# Patient Record
Sex: FEMALE | Race: WHITE | Employment: FULL TIME | URBAN - METROPOLITAN AREA
[De-identification: names, ages, dates, MRNs, and addresses within clinical notes are randomized per-mention and may not be internally consistent; named-entity substitution may affect disease eponyms.]

---

## 2017-01-26 ENCOUNTER — TELEPHONE (OUTPATIENT)
Dept: CARDIOLOGY CLINIC | Age: 32
End: 2017-01-26

## 2017-01-26 NOTE — TELEPHONE ENCOUNTER
Pt called to schedule the tilt table test and would like to speak with you. I tentatively scheduled her for 2/8 with Dr. Brittany aHrris but she wanted to get the test done asap. Please call [phone]. Thanks!

## 2017-02-08 ENCOUNTER — OFFICE VISIT (OUTPATIENT)
Dept: CARDIOLOGY CLINIC | Age: 32
End: 2017-02-08

## 2017-02-08 VITALS
RESPIRATION RATE: 16 BRPM | SYSTOLIC BLOOD PRESSURE: 118 MMHG | BODY MASS INDEX: 23.23 KG/M2 | HEART RATE: 74 BPM | WEIGHT: 148 LBS | HEIGHT: 67 IN | OXYGEN SATURATION: 100 % | DIASTOLIC BLOOD PRESSURE: 88 MMHG

## 2017-02-08 DIAGNOSIS — R42 DIZZINESS: Primary | ICD-10-CM

## 2017-02-08 RX ORDER — ONDANSETRON HYDROCHLORIDE 8 MG/1
16 TABLET, FILM COATED ORAL 2 TIMES DAILY
COMMUNITY
End: 2017-03-31

## 2017-02-08 RX ORDER — LEVOTHYROXINE SODIUM 125 UG/1
TABLET ORAL
Refills: 6 | COMMUNITY
Start: 2017-01-09

## 2017-02-08 RX ORDER — NORTRIPTYLINE HYDROCHLORIDE 25 MG/1
CAPSULE ORAL
COMMUNITY
End: 2017-03-31

## 2017-02-08 NOTE — PROGRESS NOTES
Extended / Orthostatic Vitals:  Patient Position 2: Supine  BP 2: 118/88  Pulse 2: 74  Patient Position 3: Standing  BP 3: 92/60  Pulse 3: 92

## 2017-02-08 NOTE — PATIENT INSTRUCTIONS
Treatment Plan:  Tilt Table Study    You are scheduled for a Tilt Table Study at [facility] on Tuesday, March 7th. Please arrive at the patient registration desk located on the 2nd floor of the main building at 10:30am.     Do not eat or drink anything after midnight the night before your procedure. You will need a . You may take your normal medications with a sip of water the morning of your procedure. Please call Irvin Son or Esmer Art if you have any questions at [phone]. Please call the office if you develop any type of illness prior to your procedure. Please contact our business office at [phone] with any financial concerns. Tilt Table Test: About This Test  What is it? A tilt table test is used to check people who have fainted or who often feel lightheaded. The results help your doctor know the cause of your fainting or feeling lightheaded. The test uses a special table that slowly tilts you to an upright position. It checks how your body responds when you change positions. Why is this test done? This test checks what causes your symptoms by monitoring them while changing your position. Your doctor can see if you faint or feel lightheaded because of problems with your heart rate or blood pressure. When people move from a lying position to an upright one, their blood pressure normally drops. But the body adjusts to this. Your nervous system senses changes in body position and controls your heart rate and blood pressure. If the nervous system doesn't work properly, you might feel lightheaded or faint. This can happen if your blood pressure stays too low. Your heart rate also may slow down or speed up. You feel lightheaded because your brain is not getting a normal amount of blood for a short time. This problem is called syncope (say \"RJEP-hhx-mmm\"). Syncope might happen during the test when you change to an upright position.   How can you prepare for the test?  · Tell your doctor about any medicines you take. Ask your doctor if you need to stop taking any medicines before the test.  · You may be asked to not eat or drink for a few hours before the test.  What happens during the test?  · The test is usually done in a hospital or a cardiologist's office. · You will have small patches or pads attached to your skin. These are sensors that monitor your heart. You will also have a blood pressure cuff on your arm. And you may have an IV. · During the test, you will lie flat on a table that can tilt you up to almost a standing position. You will be strapped securely to the table. · Your heart rate and blood pressure are checked regularly as the table is tilted up. · You will be asked if you feel any symptoms like nausea, sweating, dizziness, or an abnormal heartbeat. If you don't have any symptoms, you may be given medicine to speed up your heart rate. Then you will be checked for symptoms again. · If you faint during the test, the table will be returned to a flat position. You will be checked closely and taken care of right away by your medical team. Most people wake up right away. What else should you know about the test?  · The test result is normal if your blood pressure stays stable during the test and you do not feel lightheaded or faint. The test result is not normal if your blood pressure drops and you feel lightheaded or faint. These symptoms might happen because of a slow heart rate. How long does the test take? · The test will take about an hour. It may take longer if you get medicine to speed up your heart during the test.  What happens after the test?  · Your heart rate and blood pressure will be checked before you go home. · You may need to have someone drive you home after the test.  · You can probably go back to your usual activities right away.  But some people feel a little tired or nauseated  Follow-up care is a key part of your treatment and safety. Be sure to make and go to all appointments, and call your doctor if you are having problems. It's also a good idea to keep a list of the medicines you take. Ask your doctor when you can expect to have your test results. Where can you learn more? Go to http://diamond-daniel.info/. Enter Y984 in the search box to learn more about \"Tilt Table Test: About This Test.\"  Current as of: January 27, 2016  Content Version: 11.1  © 4515-7883 [employer]. Care instructions adapted under license by [employer] (which disclaims liability or warranty for this information). If you have questions about a medical condition or this instruction, always ask your healthcare professional. [address] any warranty or liability for your use of this information.

## 2017-03-06 RX ORDER — SODIUM CHLORIDE 0.9 % (FLUSH) 0.9 %
5-10 SYRINGE (ML) INJECTION EVERY 8 HOURS
Status: CANCELLED | OUTPATIENT
Start: 2017-03-07

## 2017-03-06 RX ORDER — SODIUM CHLORIDE 0.9 % (FLUSH) 0.9 %
5-10 SYRINGE (ML) INJECTION AS NEEDED
Status: CANCELLED | OUTPATIENT
Start: 2017-03-07

## 2017-03-06 NOTE — ROUTINE PROCESS
3:27 PM Pt's. Chart reviewed possibly including viewing of labs, test results, imaging results and history and physical for possible cath/angio/EP procedure.

## 2017-03-07 ENCOUNTER — HOSPITAL ENCOUNTER (OUTPATIENT)
Dept: NON INVASIVE DIAGNOSTICS | Age: 32
Discharge: HOME | End: 2017-03-07
Attending: INTERNAL MEDICINE | Admitting: INTERNAL MEDICINE
Payer: COMMERCIAL

## 2017-03-07 VITALS
WEIGHT: 147.27 LBS | BODY MASS INDEX: 23.11 KG/M2 | OXYGEN SATURATION: 98 % | DIASTOLIC BLOOD PRESSURE: 66 MMHG | HEIGHT: 67 IN | HEART RATE: 64 BPM | SYSTOLIC BLOOD PRESSURE: 113 MMHG | RESPIRATION RATE: 17 BRPM | TEMPERATURE: 97.8 F

## 2017-03-07 PROCEDURE — 74011250636 HC RX REV CODE- 250/636: Performed by: INTERNAL MEDICINE

## 2017-03-07 PROCEDURE — 77030018729 HC ELECTRD DEFIB PAD CARD -B

## 2017-03-07 PROCEDURE — 93660 TILT TABLE EVALUATION: CPT

## 2017-03-07 PROCEDURE — 74011000250 HC RX REV CODE- 250: Performed by: INTERNAL MEDICINE

## 2017-03-07 RX ORDER — SODIUM CHLORIDE 0.9 % (FLUSH) 0.9 %
5-10 SYRINGE (ML) INJECTION AS NEEDED
Status: DISCONTINUED | OUTPATIENT
Start: 2017-03-07 | End: 2017-03-07 | Stop reason: HOSPADM

## 2017-03-07 RX ORDER — SODIUM CHLORIDE 0.9 % (FLUSH) 0.9 %
5-10 SYRINGE (ML) INJECTION EVERY 8 HOURS
Status: DISCONTINUED | OUTPATIENT
Start: 2017-03-07 | End: 2017-03-07 | Stop reason: HOSPADM

## 2017-03-07 RX ORDER — ATROPINE SULFATE 0.1 MG/ML
0.5 INJECTION INTRAVENOUS
Status: DISCONTINUED | OUTPATIENT
Start: 2017-03-07 | End: 2017-03-07 | Stop reason: HOSPADM

## 2017-03-07 RX ORDER — FLUDROCORTISONE ACETATE 0.1 MG/1
0.1 TABLET ORAL DAILY
Qty: 30 TAB | Refills: 3 | Status: SHIPPED | OUTPATIENT
Start: 2017-03-07 | End: 2017-03-16 | Stop reason: SDUPTHER

## 2017-03-07 RX ORDER — SODIUM CHLORIDE 9 MG/ML
1000 INJECTION, SOLUTION INTRAVENOUS ONCE
Status: COMPLETED | OUTPATIENT
Start: 2017-03-07 | End: 2017-03-07

## 2017-03-07 RX ADMIN — SODIUM CHLORIDE 1000 ML: 900 INJECTION, SOLUTION INTRAVENOUS at 11:10

## 2017-03-07 RX ADMIN — SODIUM CHLORIDE 2 MCG/MIN: 900 INJECTION, SOLUTION INTRAVENOUS at 12:54

## 2017-03-07 RX ADMIN — Medication 10 ML: at 11:05

## 2017-03-07 NOTE — PROCEDURES
Cardiac Electrophysiology Report        PATIENT INFORMATION     Patient Name: Telma Rudolph MRN: [id number]      Study Date: 3/7/2017    YOB: 1985   Age: 33 y.o. Gender: female      Referring Physician:  Carlos Rueda DO                  Procedure:  Tilt table test      STAFF     Duty Name   Electrophysiologist Caleen Meigs, MD   Monitor Joy Nance RN; Livia Butts RN   Circulator Екатерина Morris RN       PATIENT HISTORY     Telma Rudolph is a 33 y.o. female new patient referred for possible tilt table testing. She reports intermittent episodes of shortness of breath, fatigue, ataxia and presyncope that occur while walking. She has only experienced one episode of syncope approximately 10 years ago that was unrelated to her current symptoms. Her urine is concentrated. Above symptoms started after fall which caused head trauma in 2015 for which she presented to ER. She began experiencing symptoms of vertigo which then progressed to ataxia. Testing at that time was unremarkable. She also reports history of orthostatic hypotension. She presents for a tilt table test now. PROCEDURE     The patient was brought to the Cardiac Electrophysiology laboratory in a post-absorptive, fasting state. Informed consent was obtained. A peripheral IV was in place. One liter of normal saline was infused prior to the procedure. Continuous electrocardiographic, blood pressure, O2 saturation and  CO2 monitoring was initiated. The patient was secured onto the tilt table test. Regular measurements for blood pressure, heart rate and rhythm were performed at 3 minute intervals. The patient was tilted to 80 degrees and BP/HR/symptoms were recorded at 3 minute intervals. Isoproteronol infusion was then initiated and the protocol was repeated. Carotid bruits were not audible prior to the exam. Carotid massage was performed bilaterally.  The patient remained hemodynamically stable, tolerated the procedure well and was transferred in stable condition. There were no immediate complications/blood loss encountered during the procedure. No specimens were removed. TILT TABLE TEST DATA     Time Blood Pressure Heart Rate Symptoms   Baseline 106/50 59    3 minutes 101/59 66 Dizziness   6 minutes 86/52 81 IV fluids started   9 minutes 86/87 85    12 minutes 92/85 85          (Isoproteronol infusion 2 mcg/min)      Baseline 92/85 85    3 minutes 67/40 134     6 minutes 86/44 139 Isuprel discontinued   9 minutes 132/72 69 Dizziness resolved   (Isoproteronol off)        Carotid massage: Negative bilaterally      MEDICATION SUMMARY     Medication Route Unit Total   Isoproteronol IV micrograms/min 2   Normal saline IV liters 1       CONCLUSIONS     1. Orthostatic hypotension  2. Trial of thigh-high compression socks  3. Trial of florinef. 4. Follow up in 1 month. Thank you, Carie Mcarthur DO for involving me in the care of this extraordinarily pleasant female.        Catherine Trimble MD  Cardiac Electrophysiology / Cardiology    [address], [address] [facility], [address]  Alphonse English [address] [place], [place] [address]  [phone] / [phone] Fax [phone] / [phone] Fax

## 2017-03-07 NOTE — H&P
HISTORY OF PRESENTING ILLNESS       Jillian Rouse is a 33 y.o. female new patient referred for possible tilt table testing. She reports intermittent episodes of shortness of breath, fatigue, ataxia and presyncope that occur when she is walking. She has only experienced one episode of syncope approximately 10 years ago that was unrelated to her current symptoms. Her urine is concentrated. Above symptoms started after fall which caused head trauma in January 2015 for which she presented to ER. She began experiencing symptoms of vertigo which then progressed to ataxia. All testing at the hospital at that time was normal. She also reports history of orthostatic hypotension.          ACTIVE PROBLEM LIST           Patient Active Problem List     Diagnosis Date Noted    Shuffling gait 10/18/2016    Migraine with aura, without mention of intractable migraine without mention of status migrainosus 03/31/2015    Memory loss 03/31/2015    Ataxia 03/31/2015    Sleep apnea 03/31/2015    ADHD (attention deficit hyperactivity disorder) 03/31/2015    B12 deficiency 03/31/2015    Osteoporosis 03/31/2015    Dizziness 01/23/2015            PAST MEDICAL HISTORY            Past Medical History   Diagnosis Date    Depression      Hypothyroid      Hypothyroid      Insomnia      Migraine      Shuffling gait 10/18/2016       --intermittent when head was shaking/bobbing     Vertigo              PAST SURGICAL HISTORY      History reviewed.  No pertinent past surgical history.        ALLERGIES      No Known Allergies         FAMILY HISTORY            Family History   Problem Relation Age of Onset    Heart Disease Maternal Grandfather      Cancer Paternal Grandmother      Heart Disease Paternal Grandmother      negative for cardiac disease        SOCIAL HISTORY       Social History                Social History    Marital status: SINGLE       Spouse name: N/A    Number of children: N/A    Years of education: N/A              Social History Main Topics     Smoking status: Never Smoker     Smokeless tobacco: None     Alcohol use Yes         Comment: rarely     Drug use: No     Sexual activity: Not Asked            Other Topics Concern    None      Social History Narrative               MEDICATIONS           Current Outpatient Prescriptions   Medication Sig    SYNTHROID 125 mcg tablet TAKE 1 TABLET BY MOUTH DAILY    ondansetron hcl (ZOFRAN, AS HYDROCHLORIDE,) 8 mg tablet Take 16 mg by mouth two (2) times a day.  nortriptyline (PAMELOR) 25 mg capsule Take by mouth nightly.  Armodafinil (NUVIGIL) 250 mg tab tablet TAKE 1 TABLET BY MOUTH DAILY    ADDERALL XR 15 mg XR capsule Take 20 mg by mouth as needed.  CRYSELLE, 28, 0.3-30 mg-mcg per tablet      sertraline (ZOLOFT) 100 mg tablet Take 100 mg by mouth daily.      No current facility-administered medications for this visit.          I have reviewed the nurses notes, vitals, problem list, allergy list, medical history, family, social history and medications.        REVIEW OF SYMPTOMS       General: Pt denies excessive weight gain or loss. Pt is able to conduct ADL's  HEENT: Denies blurred vision, headaches, hearing loss, epistaxis and difficulty swallowing. Respiratory: Denies cough, congestion, shortness of breath, WOODS, wheezing or stridor. Cardiovascular: Denies precordial pain, palpitations, edema or PND  Gastrointestinal: Denies poor appetite, indigestion, abdominal pain or blood in stool  Genitourinary: Denies hematuria, dysuria, increased urinary frequency  Musculoskeletal: Denies joint pain or swelling from muscles or joints  Neurologic: Denies tremor, paresthesias, headache, or sensory motor disturbance  Psychiatric: Denies confusion, insomnia, depression  Integumentray: Denies rash, itching or ulcers.   Hematologic: Denies easy bruising, bleeding     PHYSICAL EXAMINATION           Vitals:     02/08/17 1538   BP: 118/88   Pulse: 74 Resp: 16   SpO2: 100%   Weight: 148 lb (67.1 kg)   Height: 5' 7\" (1.702 m)      General: Well developed, in no acute distress. HEENT: No jaundice, oral mucosa moist, no oral ulcers  Neck: Supple, no stiffness, no lymphadenopathy, supple  Heart:  Normal S1/S2 negative S3 or S4. Regular, no murmur, gallop or rub, no jugular venous distention  Respiratory: Clear bilaterally x 4, no wheezing or rales  Abdomen:   Soft, non-tender, bowel sounds are active.   Extremities: No edema, normal cap refill, no cyanosis. Musculoskeletal: No clubbing, no deformities  Neuro: A&Ox3, speech clear, gait stable, cooperative, no focal neurologic deficits  Skin: Skin color is normal. No rashes or lesions. Non diaphoretic, moist.  Vascular: 2+ pulses symmetric in all extremities        DIAGNOSTIC DATA       EKG:         LABORATORY DATA             Lab Results   Component Value Date/Time     WBC 4.9 10/18/2016 06:26 AM     HGB 11.9 10/18/2016 06:26 AM     HCT 36.6 10/18/2016 06:26 AM     PLATELET 984 21/90/6169 06:26 AM     MCV 88.4 10/18/2016 06:26 AM            Lab Results   Component Value Date/Time     Sodium 142 10/18/2016 06:26 AM     Potassium 3.7 10/18/2016 06:26 AM     Chloride 108 10/18/2016 06:26 AM     CO2 25 10/18/2016 06:26 AM     Anion gap 9 10/18/2016 06:26 AM     Glucose 87 10/18/2016 06:26 AM     BUN 9 10/18/2016 06:26 AM     Creatinine 0.68 10/18/2016 06:26 AM     BUN/Creatinine ratio 13 10/18/2016 06:26 AM     GFR est AA >60 10/18/2016 06:26 AM     GFR est non-AA >60 10/18/2016 06:26 AM     Calcium 7.8 10/18/2016 06:26 AM     Bilirubin, total 0.4 10/17/2016 04:49 PM     AST (SGOT) 18 10/17/2016 04:49 PM     Alk. phosphatase 34 10/17/2016 04:49 PM     Protein, total 8.1 10/17/2016 04:49 PM     Albumin 4.0 10/17/2016 04:49 PM     Globulin 4.1 10/17/2016 04:49 PM     A-G Ratio 1.0 10/17/2016 04:49 PM     ALT (SGPT) 22 10/17/2016 04:49 PM            ASSESSMENT       1. Dizziness  2. Presyncope  3.  Ataxia         PLAN    Will arrange for tilt table test.      FOLLOW-UP      Follow up after testing completed.      Thank you, Kerri Marie DO and Dr Ginna Ojeda for involving me in the care of this extraordinarily pleasant female. Please do not hesitate to contact me for further questions/concerns.      Written by Shilpa Fulton, as dictated by Per Lucero MD.      Per Lucero MD  Cardiac Electrophysiology / Cardiology     [address]  [phone] / [phone] Fax   [phone] / [phone] Fax           No changes in H/P since clinic visit.  Proceed with tilt table test.     Per Lucero MD

## 2017-03-07 NOTE — PROGRESS NOTES
Patient received via personal wheel chair from waiting area, verbalizes understanding for scheduled procedure, questions answered, and consent signed. Pt verbalizes that Dr. Guerline Ricks informed her that she needed a ride home as she might not be safe to drive herself. Pt states that she was not able to get her friend to drive her here and that she drove herself and plans to drive herself home. Dr. Guerline Ricks informed that pt arrived via wheel chair and without a . He stated that we would proceed with the Tilt Table Study. 200 Pt is prepped and ready for her procedure. 1210 Pt lying PL, both eyes closed, and appears to be asleep. 1235 TRANSFER - OUT REPORT:    Verbal report given to Ruthie Dong (name) on [facility]  being transferred to EP Lab (unit) for ordered procedure       Report consisted of patients Situation, Background, Assessment and   Recommendations(SBAR). Information from the following report(s) SBAR was reviewed with the receiving nurse. Lines:   Peripheral IV 03/07/17 Right Antecubital (Active)   Site Assessment Clean, dry, & intact 3/7/2017 11:15 AM   Phlebitis Assessment 0 3/7/2017 11:15 AM   Infiltration Assessment 0 3/7/2017 11:15 AM   Dressing Status New 3/7/2017 11:15 AM   Dressing Type Tape;Transparent 3/7/2017 11:15 AM   Hub Color/Line Status Pink 3/7/2017 11:15 AM   Action Taken Open ports on tubing capped 3/7/2017 11:15 AM   Alcohol Cap Used Yes 3/7/2017 11:15 AM        Opportunity for questions and clarification was provided. Patient transported with:   Registered Nurse X 2.

## 2017-03-07 NOTE — DISCHARGE INSTRUCTIONS
Tilt Table Test: About This Test  What is it? A tilt table test is used to check people who have fainted or who often feel lightheaded. The results help your doctor know the cause of your fainting or feeling lightheaded. The test uses a special table that slowly tilts you to an upright position. It checks how your body responds when you change positions. Why is this test done? This test checks what causes your symptoms by monitoring them while changing your position. Your doctor can see if you faint or feel lightheaded because of problems with your heart rate or blood pressure. When people move from a lying position to an upright one, their blood pressure normally drops. But the body adjusts to this. Your nervous system senses changes in body position and controls your heart rate and blood pressure. If the nervous system doesn't work properly, you might feel lightheaded or faint. This can happen if your blood pressure stays too low. Your heart rate also may slow down or speed up. You feel lightheaded because your brain is not getting a normal amount of blood for a short time. This problem is called syncope (say \"YSIX-frv-grc\"). Syncope might happen during the test when you change to an upright position. How can you prepare for the test?  · Tell your doctor about any medicines you take. Ask your doctor if you need to stop taking any medicines before the test.  · You may be asked to not eat or drink for a few hours before the test.  What happens during the test?  · The test is usually done in a hospital or a cardiologist's office. · You will have small patches or pads attached to your skin. These are sensors that monitor your heart. You will also have a blood pressure cuff on your arm. And you may have an IV. · During the test, you will lie flat on a table that can tilt you up to almost a standing position. You will be strapped securely to the table.   · Your heart rate and blood pressure are checked regularly as the table is tilted up. · You will be asked if you feel any symptoms like nausea, sweating, dizziness, or an abnormal heartbeat. If you don't have any symptoms, you may be given medicine to speed up your heart rate. Then you will be checked for symptoms again. · If you faint during the test, the table will be returned to a flat position. You will be checked closely and taken care of right away by your medical team. Most people wake up right away. What else should you know about the test?  · The test result is normal if your blood pressure stays stable during the test and you do not feel lightheaded or faint. The test result is not normal if your blood pressure drops and you feel lightheaded or faint. These symptoms might happen because of a slow heart rate. How long does the test take? · The test will take about an hour. It may take longer if you get medicine to speed up your heart during the test.  What happens after the test?  · Your heart rate and blood pressure will be checked before you go home. · You may need to have someone drive you home after the test.  · You can probably go back to your usual activities right away. But some people feel a little tired or nauseated  Follow-up care is a key part of your treatment and safety. Be sure to make and go to all appointments, and call your doctor if you are having problems. It's also a good idea to keep a list of the medicines you take. Ask your doctor when you can expect to have your test results. Dr. Yamilka Oh would like to see you in the office in one month. Where can you learn more? Go to http://diamond-daniel.info/. Enter D845 in the search box to learn more about \"Tilt Table Test: About This Test.\"  Current as of: January 27, 2016  Content Version: 11.1  © 3119-2834 [employer]. Care instructions adapted under license by [employer] (which disclaims liability or warranty for this information).  If you have questions about a medical condition or this instruction, always ask your healthcare professional. [place] [address] any warranty or liability for your use of this information.

## 2017-03-07 NOTE — PROGRESS NOTES
TRANSFER - OUT REPORT:    Verbal report given to Wilson Gruber RN on Janes  being transferred to [facility] for routine progression of care       Report consisted of patients Situation, Background, Assessment and   Recommendations(SBAR). Information from the following report(s) SBAR was reviewed with the receiving nurse. Lines:   Peripheral IV 03/07/17 Right Antecubital (Active)   Site Assessment Clean, dry, & intact 3/7/2017 11:15 AM   Phlebitis Assessment 0 3/7/2017 11:15 AM   Infiltration Assessment 0 3/7/2017 11:15 AM   Dressing Status New 3/7/2017 11:15 AM   Dressing Type Tape;Transparent 3/7/2017 11:15 AM   Hub Color/Line Status Pink 3/7/2017 11:15 AM   Action Taken Open ports on tubing capped 3/7/2017 11:15 AM   Alcohol Cap Used Yes 3/7/2017 11:15 AM        Opportunity for questions and clarification was provided.       Patient transported with:   Registered Nurse

## 2017-03-16 DIAGNOSIS — R42 DIZZINESS: Primary | ICD-10-CM

## 2017-03-16 RX ORDER — FLUDROCORTISONE ACETATE 0.1 MG/1
0.1 TABLET ORAL DAILY
Qty: 60 TAB | Refills: 6 | Status: SHIPPED | OUTPATIENT
Start: 2017-03-16 | End: 2017-03-29 | Stop reason: SDUPTHER

## 2017-03-16 NOTE — TELEPHONE ENCOUNTER
Requested Prescriptions     Signed Prescriptions Disp Refills    fludrocortisone (FLORINEF) 0.1 mg tablet 60 Tab 6     Sig: Take 1 Tab by mouth daily.      Authorizing Provider: Nancy Storm     Ordering User: Edgardo Wiley     Verbal order for refill per Dr. Toney Mcgrath

## 2017-03-29 DIAGNOSIS — R42 DIZZINESS: ICD-10-CM

## 2017-03-29 RX ORDER — FLUDROCORTISONE ACETATE 0.1 MG/1
0.2 TABLET ORAL DAILY
Qty: 60 TAB | Refills: 6 | Status: SHIPPED | OUTPATIENT
Start: 2017-03-29 | End: 2017-10-18 | Stop reason: SDUPTHER

## 2017-03-29 NOTE — TELEPHONE ENCOUNTER
Verbal order for refill per Dr. Brittany Harris  Requested Prescriptions     Signed Prescriptions Disp Refills    fludrocortisone (FLORINEF) 0.1 mg tablet 60 Tab 6     Sig: Take 2 Tabs by mouth daily.      Authorizing Provider: Fito Lozada     Ordering User: Capo Garnica

## 2017-03-31 ENCOUNTER — OFFICE VISIT (OUTPATIENT)
Dept: CARDIOLOGY CLINIC | Age: 32
End: 2017-03-31

## 2017-03-31 VITALS — BODY MASS INDEX: 23.18 KG/M2 | WEIGHT: 148 LBS

## 2017-03-31 DIAGNOSIS — I95.1 ORTHOSTASIS: Primary | ICD-10-CM

## 2017-03-31 RX ORDER — MIDODRINE HYDROCHLORIDE 5 MG/1
5 TABLET ORAL
Qty: 90 TAB | Refills: 0 | Status: SHIPPED | OUTPATIENT
Start: 2017-03-31 | End: 2017-04-03 | Stop reason: SDUPTHER

## 2017-03-31 NOTE — MR AVS SNAPSHOT
Visit Information Date & Time Provider Department Dept. Phone Encounter #  
 3/31/2017 10:40 AM Zulay Munoz MD [facility] [phone] [id number] Your Appointments 5/8/2017  9:40 AM  
ESTABLISHED PATIENT with Zulay Munoz MD  
[facility] ([facility]) Appt Note: 1 mo fu  
 [address] [facility] [address] [facility] 788  
 [facility] [address] [address] Upcoming Health Maintenance Date Due DTaP/Tdap/Td series (1 - Tdap) 1/24/2007 PAP AKA CERVICAL CYTOLOGY 1/24/2007 INFLUENZA AGE 9 TO ADULT 8/1/2016 Allergies as of 3/31/2017  Review Complete On: 3/31/2017 By: Zulay Munoz MD  
 No Known Allergies Current Immunizations  Never Reviewed No immunizations on file. Not reviewed this visit You Were Diagnosed With   
  
 Codes Comments Orthostasis    -  Primary ICD-10-CM: I95.1 ICD-9-CM: 458.0 Vitals Weight(growth percentile) BMI OB Status Smoking Status 148 lb (67.1 kg) 23.18 kg/m2 Having regular periods Never Smoker BMI and BSA Data Body Mass Index Body Surface Area  
 23.18 kg/m 2 1.78 m 2 Preferred Pharmacy Pharmacy Name Phone [id number], [address] Jenney Holstein [phone] Your Updated Medication List  
  
   
This list is accurate as of: 3/31/17 12:13 PM.  Always use your most recent med list.  
  
  
  
  
 ADDERALL XR 15 mg XR capsule Generic drug:  amphetamine-dextroamphetamine XR Take 20 mg by mouth as needed. fludrocortisone 0.1 mg tablet Commonly known as:  FLORINEF Take 2 Tabs by mouth daily. midodrine 5 mg tablet Commonly known as:  Javier Mussel Take 1 Tab by mouth three (3) times daily (with meals) for 30 days. sertraline 100 mg tablet Commonly known as:  ZOLOFT Take 100 mg by mouth daily. SYNTHROID 125 mcg tablet Generic drug:  levothyroxine TAKE 1 TABLET BY MOUTH DAILY Prescriptions Sent to Pharmacy Refills  
 midodrine (PROAMITINE) 5 mg tablet 0 Sig: Take 1 Tab by mouth three (3) times daily (with meals) for 30 days. Class: Normal  
 Pharmacy: [address], [address] Ph #: [phone] Route: Oral  
  
Introducing [facility]! Dear Robb John: Thank you for requesting a [employer] account. Our records indicate that you already have an active [employer] account. You can access your account anytime at [email or web address] Did you know that you can access your hospital and ER discharge instructions at any time in Red T[employer]? You can also review all of your test results from your hospital stay or ER visit. Additional Information If you have questions, please visit the Frequently Asked Questions section of the [employer] website at [email or web address] Remember, Red Tricycle is NOT to be used for urgent needs. For medical emergencies, dial 911. Now available from your iPhone and Android! Please provide this summary of care documentation to your next provider. Your primary care clinician is listed as Yoselyn Barrera. If you have any questions after today's visit, please call [phone].

## 2017-03-31 NOTE — PROGRESS NOTES
Extended / Orthostatic Vitals:  Patient Position 2: Supine  BP 2: 96/68  Pulse 2: 58  Patient Position 3: Standing  BP 3: (!) 78/66  Pulse 3: 78

## 2017-03-31 NOTE — PROGRESS NOTES
HISTORY OF PRESENTING ILLNESS      Tuan Sapp is a 33 y.o. female with reports of intermittent episodes of shortness of breath, fatigue, ataxia and presyncope that occur when she is walking. She has only experienced one episode of syncope approximately 10 years ago that was unrelated to her current symptoms. Her urine is concentrated. Above symptoms started after fall which caused head trauma in January 2015 for which she presented to ER. She began experiencing symptoms of vertigo which then progressed to ataxia. All testing at the hospital at that time was normal. She also reports history of orthostatic hypotension. She underwent tilt table testing that was positive for orthostatic hypotension and a trial of Florinef was started. We also encouraged adequate hydration and compression stockings. Her florinef was increased to 0.2mg by phone and she now presents for follow up. Overall her dizziness has improved with this dosing. She had one episode yesterday with difficulty walking/moving her legs. This lasted for an hour and resolved. She reports that it was no different than previous episodes prior to start of Florinef. ACTIVE PROBLEM LIST     Patient Active Problem List    Diagnosis Date Noted    Shuffling gait 10/18/2016    Migraine with aura, without mention of intractable migraine without mention of status migrainosus 03/31/2015    Memory loss 03/31/2015    Ataxia 03/31/2015    Sleep apnea 03/31/2015    ADHD (attention deficit hyperactivity disorder) 03/31/2015    B12 deficiency 03/31/2015    Osteoporosis 03/31/2015    Dizziness 01/23/2015           PAST MEDICAL HISTORY     Past Medical History:   Diagnosis Date    Depression     Hypothyroid     Hypothyroid     Insomnia     Migraine     Shuffling gait 10/18/2016    --intermittent when head was shaking/bobbing     Vertigo            PAST SURGICAL HISTORY     No past surgical history on file.        ALLERGIES     No Known Allergies       FAMILY HISTORY     Family History   Problem Relation Age of Onset    Heart Disease Maternal Grandfather     Cancer Paternal Grandmother     Heart Disease Paternal Grandmother     negative for cardiac disease       SOCIAL HISTORY     Social History     Social History    Marital status: SINGLE     Spouse name: N/A    Number of children: N/A    Years of education: N/A     Social History Main Topics    Smoking status: Never Smoker    Smokeless tobacco: Not on file    Alcohol use Yes      Comment: rarely    Drug use: No    Sexual activity: Not on file     Other Topics Concern    Not on file     Social History Narrative         MEDICATIONS     Current Outpatient Prescriptions   Medication Sig    fludrocortisone (FLORINEF) 0.1 mg tablet Take 2 Tabs by mouth daily.  SYNTHROID 125 mcg tablet TAKE 1 TABLET BY MOUTH DAILY    ondansetron hcl (ZOFRAN, AS HYDROCHLORIDE,) 8 mg tablet Take 16 mg by mouth two (2) times a day.  nortriptyline (PAMELOR) 25 mg capsule Take  by mouth nightly.  Armodafinil (NUVIGIL) 250 mg tab tablet TAKE 1 TABLET BY MOUTH DAILY    ADDERALL XR 15 mg XR capsule Take 20 mg by mouth as needed.  CRYSELLE, 28, 0.3-30 mg-mcg per tablet     sertraline (ZOLOFT) 100 mg tablet Take 100 mg by mouth daily. No current facility-administered medications for this visit. I have reviewed the nurses notes, vitals, problem list, allergy list, medical history, family, social history and medications. REVIEW OF SYMPTOMS      General: Pt denies excessive weight gain or loss. Pt is able to conduct ADL's  HEENT: Denies blurred vision, headaches, hearing loss, epistaxis and difficulty swallowing. Respiratory: Denies cough, congestion, shortness of breath, WOODS, wheezing or stridor.   Cardiovascular: Denies precordial pain, palpitations, edema or PND  Gastrointestinal: Denies poor appetite, indigestion, abdominal pain or blood in stool  Genitourinary: Denies hematuria, dysuria, increased urinary frequency  Musculoskeletal: Denies joint pain or swelling from muscles or joints  Neurologic: Denies tremor, paresthesias, headache, or sensory motor disturbance  Psychiatric: Denies confusion, insomnia, depression  Integumentray: Denies rash, itching or ulcers. Hematologic: Denies easy bruising, bleeding       PHYSICAL EXAMINATION      There were no vitals filed for this visit. General: Well developed, in no acute distress. HEENT: No jaundice, oral mucosa moist, no oral ulcers  Neck: Supple, no stiffness, no lymphadenopathy, supple  Heart:  Normal S1/S2 negative S3 or S4. Regular, no murmur, gallop or rub, no jugular venous distention  Respiratory: Clear bilaterally x 4, no wheezing or rales  Abdomen:   Soft, non-tender, bowel sounds are active.   Extremities:  No edema, normal cap refill, no cyanosis. Musculoskeletal: No clubbing, no deformities  Neuro: A&Ox3, speech clear, gait stable, cooperative, no focal neurologic deficits  Skin: Skin color is normal. No rashes or lesions. Non diaphoretic, moist.  Vascular: 2+ pulses symmetric in all extremities       DIAGNOSTIC DATA      EKG:        LABORATORY DATA      Lab Results   Component Value Date/Time    WBC 4.9 10/18/2016 06:26 AM    HGB 11.9 10/18/2016 06:26 AM    HCT 36.6 10/18/2016 06:26 AM    PLATELET 017 92/59/6057 06:26 AM    MCV 88.4 10/18/2016 06:26 AM      Lab Results   Component Value Date/Time    Sodium 142 10/18/2016 06:26 AM    Potassium 3.7 10/18/2016 06:26 AM    Chloride 108 10/18/2016 06:26 AM    CO2 25 10/18/2016 06:26 AM    Anion gap 9 10/18/2016 06:26 AM    Glucose 87 10/18/2016 06:26 AM    BUN 9 10/18/2016 06:26 AM    Creatinine 0.68 10/18/2016 06:26 AM    BUN/Creatinine ratio 13 10/18/2016 06:26 AM    GFR est AA >60 10/18/2016 06:26 AM    GFR est non-AA >60 10/18/2016 06:26 AM    Calcium 7.8 10/18/2016 06:26 AM    Bilirubin, total 0.4 10/17/2016 04:49 PM    AST (SGOT) 18 10/17/2016 04:49 PM    Alk.  phosphatase 34 10/17/2016 04:49 PM    Protein, total 8.1 10/17/2016 04:49 PM    Albumin 4.0 10/17/2016 04:49 PM    Globulin 4.1 10/17/2016 04:49 PM    A-G Ratio 1.0 10/17/2016 04:49 PM    ALT (SGPT) 22 10/17/2016 04:49 PM           ASSESSMENT      1. Dizziness  2. Presyncope  3. Ataxia       PLAN     She is happy with the improvement in dizziness since increasing her Florinef dose to 0.2mg. We will start a trial of midodrine 5 mg 3 times daily and evaluate for improvement in orthostatic hypotension. FOLLOW-UP     Follow up 1 month    Thank you, Pattie Collins DO and Dr. Guillermo Rivera for allowing me to participate in the care of this extraordinarily pleasant female. Please do not hesitate to contact me for further questions/concerns.      Nancy Thurman, JUNAID Birch MD  Cardiac Electrophysiology / Cardiology    [address]  [address] [place] [address] [address]  [phone] / [phone] Fax   [phone] / [phone] Fax

## 2017-04-03 RX ORDER — MIDODRINE HYDROCHLORIDE 5 MG/1
5 TABLET ORAL
Qty: 90 TAB | Refills: 0 | Status: SHIPPED | OUTPATIENT
Start: 2017-04-03 | End: 2017-05-03

## 2017-04-07 ENCOUNTER — TELEPHONE (OUTPATIENT)
Dept: CARDIOLOGY CLINIC | Age: 32
End: 2017-04-07

## 2017-04-07 NOTE — TELEPHONE ENCOUNTER
Incoming call. C: shortness of breath, chest pain    O: Started midodrine on 4/3. Also taking 0.2mg of Florinef daily. Improved blood pressures reported: 100/60. I: Instructed patient to hold midodrine for next few days to see if chest pain/SOB improves. Instructed to notify office of results.

## 2017-05-11 RX ORDER — DROXIDOPA 100 MG/1
100 CAPSULE ORAL 3 TIMES DAILY
Qty: 495 CAP | Refills: 2
Start: 2017-05-11 | End: 2017-07-06

## 2017-07-06 ENCOUNTER — OFFICE VISIT (OUTPATIENT)
Dept: NEUROLOGY | Age: 32
End: 2017-07-06

## 2017-07-06 DIAGNOSIS — E06.3 HASHIMOTO ENCEPHALOPATHY: Primary | ICD-10-CM

## 2017-07-06 DIAGNOSIS — G47.11 IDIOPATHIC HYPERSOMNIA: ICD-10-CM

## 2017-07-06 DIAGNOSIS — R27.0 ATAXIA: ICD-10-CM

## 2017-07-06 DIAGNOSIS — I95.1 ORTHOSTATIC HYPOTENSION: ICD-10-CM

## 2017-07-06 DIAGNOSIS — G93.49 HASHIMOTO ENCEPHALOPATHY: Primary | ICD-10-CM

## 2017-07-06 RX ORDER — METHYLPREDNISOLONE 4 MG/1
TABLET ORAL
Qty: 1 DOSE PACK | Refills: 0 | Status: SHIPPED | OUTPATIENT
Start: 2017-07-06 | End: 2018-02-16

## 2017-07-06 NOTE — PATIENT INSTRUCTIONS
[facility]   Statement to Patients  April 1, 2014      In an effort to ensure the large volume of patient prescription refills is processed in the most efficient and expeditious manner, we are asking our patients to assist us by calling your Pharmacy for all prescription refills, this will include also your  Mail Order Pharmacy. The pharmacy will contact our office electronically to continue the refill process. Please do not wait until the last minute to call your pharmacy. We need at least 48 hours (2days) to fill prescriptions. We also encourage you to call your pharmacy before going to  your prescription to make sure it is ready. With regard to controlled substance prescription refill requests (narcotic refills) that need to be picked up at our office, we ask your cooperation by providing us with at least 72 hours (3days) notice that you will need a refill. We will not refill narcotic prescription refill requests after 4:00pm on any weekday, Monday through Thursday, or after 2:00pm on Fridays, or on the weekends. We encourage everyone to explore another way of getting your prescription refill request processed using Mixers, our patient web portal through our electronic medical record system. Mixers is an efficient and effective way to communicate your medication request directly to the office and  downloadable as an clark on your smart phone . Mixers also features a review functionality that allows you to view your medication list as well as leave messages for your physician. Are you ready to get connected? If so please review the attatched instructions or speak to any of our staff to get you set up right away! Thank you so much for your cooperation. Should you have any questions please contact our Practice Administrator.     The Physicians and Staff,  [facility]          A Healthy Lifestyle: Care Instructions  Your Care Instructions  A healthy lifestyle can help you feel good, stay at a healthy weight, and have plenty of energy for both work and play. A healthy lifestyle is something you can share with your whole family. A healthy lifestyle also can lower your risk for serious health problems, such as high blood pressure, heart disease, and diabetes. You can follow a few steps listed below to improve your health and the health of your family. Follow-up care is a key part of your treatment and safety. Be sure to make and go to all appointments, and call your doctor if you are having problems. Its also a good idea to know your test results and keep a list of the medicines you take. How can you care for yourself at home? · Do not eat too much sugar, fat, or fast foods. You can still have dessert and treats now and then. The goal is moderation. · Start small to improve your eating habits. Pay attention to portion sizes, drink less juice and soda pop, and eat more fruits and vegetables. ¨ Eat a healthy amount of food. A 3-ounce serving of meat, for example, is about the size of a deck of cards. Fill the rest of your plate with vegetables and whole grains. ¨ Limit the amount of soda and sports drinks you have every day. Drink more water when you are thirsty. ¨ Eat at least 5 servings of fruits and vegetables every day. It may seem like a lot, but it is not hard to reach this goal. A serving or helping is 1 piece of fruit, 1 cup of vegetables, or 2 cups of leafy, raw vegetables. Have an apple or some carrot sticks as an afternoon snack instead of a candy bar. Try to have fruits and/or vegetables at every meal.  · Make exercise part of your daily routine. You may want to start with simple activities, such as walking, bicycling, or slow swimming. Try to be active 30 to 60 minutes every day. You do not need to do all 30 to 60 minutes all at once. For example, you can exercise 3 times a day for 10 or 20 minutes.  Moderate exercise is safe for most people, but it is always a good idea to talk to your doctor before starting an exercise program.  · Keep moving. Hugoálvaro Feeler the lawn, work in the garden, or [employer]. Take the stairs instead of the elevator at work. · If you smoke, quit. People who smoke have an increased risk for heart attack, stroke, cancer, and other lung illnesses. Quitting is hard, but there are ways to boost your chance of quitting tobacco for good. ¨ Use nicotine gum, patches, or lozenges. ¨ Ask your doctor about stop-smoking programs and medicines. ¨ Keep trying. In addition to reducing your risk of diseases in the future, you will notice some benefits soon after you stop using tobacco. If you have shortness of breath or asthma symptoms, they will likely get better within a few weeks after you quit. · Limit how much alcohol you drink. Moderate amounts of alcohol (up to 2 drinks a day for men, 1 drink a day for women) are okay. But drinking too much can lead to liver problems, high blood pressure, and other health problems. Family health  If you have a family, there are many things you can do together to improve your health. · Eat meals together as a family as often as possible. · Eat healthy foods. This includes fruits, vegetables, lean meats and dairy, and whole grains. · Include your family in your fitness plan. Most people think of activities such as jogging or tennis as the way to fitness, but there are many ways you and your family can be more active. Anything that makes you breathe hard and gets your heart pumping is exercise. Here are some tips:  ¨ Walk to do errands or to take your child to school or the bus. ¨ Go for a family bike ride after dinner instead of watching TV. Where can you learn more? Go to http://diamond-daniel.info/. Enter I379 in the search box to learn more about \"A Healthy Lifestyle: Care Instructions. \"  Current as of: July 26, 2016  Content Version: 11.3  © 4495-4899 [employer]. Care instructions adapted under license by [employer] (which disclaims liability or warranty for this information). If you have questions about a medical condition or this instruction, always ask your healthcare professional. [address] any warranty or liability for your use of this information.

## 2017-07-06 NOTE — MR AVS SNAPSHOT
Visit Information Date & Time Provider Department Dept. Phone Encounter #  
 7/6/2017  3:30 PM Sury Woodson NP Neurology Clinic at [facility] [phone] [id number] Your Appointments 7/28/2017 11:40 AM  
Follow Up with Jose Page MD  
Neurology Clinic at [facility] [address] [facility]) Appt Note: Follow up $0CP tdb 7/6/17  
 [address] [facility], 
[address], [address] [phone]  
[address] [facility], [address], [address] [phone]  
  
    
 8/7/2017 11:40 AM  
ESTABLISHED PATIENT with Antoine Thompson MD  
[facility] ([address]) Appt Note: 1 mo fu; 5/11/17 lm on pts ans srvc pls cb to rs kmr; 7.3.17 pt rs 5.8.17 [address] [place] [address] Upcoming Health Maintenance Date Due DTaP/Tdap/Td series (1 - Tdap) 1/24/2007 PAP AKA CERVICAL CYTOLOGY 1/24/2007 INFLUENZA AGE 9 TO ADULT 8/1/2017 Allergies as of 7/6/2017  Review Complete On: 7/6/2017 By: Carla Gerard LPN No Known Allergies Current Immunizations  Never Reviewed No immunizations on file. Not reviewed this visit You Were Diagnosed With   
  
 Codes Comments Hashimoto encephalopathy    -  Primary ICD-10-CM: E06.3, G93.49 
ICD-9-CM: 348.39, 245.2 Vitals OB Status Smoking Status Having regular periods Never Smoker Preferred Pharmacy Pharmacy Name Phone [id number], [address] [facility] [phone] Your Updated Medication List  
  
   
This list is accurate as of: 7/6/17  4:32 PM.  Always use your most recent med list.  
  
  
  
  
 ADDERALL XR 15 mg XR capsule Generic drug:  amphetamine-dextroamphetamine XR Take 20 mg by mouth as needed. fludrocortisone 0.1 mg tablet Commonly known as:  FLORINEF Take 2 Tabs by mouth daily. methylPREDNISolone 4 mg tablet Commonly known as:  Fan Lanedrs Per dose pack instructions  
  
 sertraline 100 mg tablet Commonly known as:  ZOLOFT Take 100 mg by mouth daily. SYNTHROID 125 mcg tablet Generic drug:  levothyroxine TAKE 1 TABLET BY MOUTH DAILY Prescriptions Sent to Pharmacy Refills  
 methylPREDNISolone (MEDROL DOSEPACK) 4 mg tablet 0 Sig: Per dose pack instructions Class: Normal  
 Pharmacy: [address] Ph #: [phone] To-Do List   
 07/06/2017 Neurology:  EEG Referral Information Referral ID Referred By Referred To  
  
 [id number] Naa Lombardi V Not Available Visits Status Start Date End Date 1 New Request 7/6/17 7/6/18 If your referral has a status of pending review or denied, additional information will be sent to support the outcome of this decision. Patient Instructions PRESCRIPTION REFILL POLICY [facility] Statement to Patients April 1, 2014 In an effort to ensure the large volume of patient prescription refills is processed in the most efficient and expeditious manner, we are asking our patients to assist us by calling your Pharmacy for all prescription refills, this will include also your  Mail Order Pharmacy. The pharmacy will contact our office electronically to continue the refill process. Please do not wait until the last minute to call your pharmacy. We need at least 48 hours (2days) to fill prescriptions. We also encourage you to call your pharmacy before going to  your prescription to make sure it is ready.   
 
With regard to controlled substance prescription refill requests (narcotic refills) that need to be picked up at our office, we ask your cooperation by providing us with at least 72 hours (3days) notice that you will need a refill. We will not refill narcotic prescription refill requests after 4:00pm on any weekday, Monday through Thursday, or after 2:00pm on Fridays, or on the weekends. We encourage everyone to explore another way of getting your prescription refill request processed using [employer], our patient web portal through our electronic medical record system. [employer] is an efficient and effective way to communicate your medication request directly to the office and  downloadable as an clark on your smart phone . [employer] also features a review functionality that allows you to view your medication list as well as leave messages for your physician. Are you ready to get connected? If so please review the attatched instructions or speak to any of our staff to get you set up right away! Thank you so much for your cooperation. Should you have any questions please contact our Practice Administrator. The Physicians and Staff,  [facility] A Healthy Lifestyle: Care Instructions Your Care Instructions A healthy lifestyle can help you feel good, stay at a healthy weight, and have plenty of energy for both work and play. A healthy lifestyle is something you can share with your whole family. A healthy lifestyle also can lower your risk for serious health problems, such as high blood pressure, heart disease, and diabetes. You can follow a few steps listed below to improve your health and the health of your family. Follow-up care is a key part of your treatment and safety. Be sure to make and go to all appointments, and call your doctor if you are having problems. Its also a good idea to know your test results and keep a list of the medicines you take. How can you care for yourself at home? · Do not eat too much sugar, fat, or fast foods. You can still have dessert and treats now and then. The goal is moderation. · Start small to improve your eating habits. Pay attention to portion sizes, drink less juice and soda pop, and eat more fruits and vegetables. ¨ Eat a healthy amount of food. A 3-ounce serving of meat, for example, is about the size of a deck of cards. Fill the rest of your plate with vegetables and whole grains. ¨ Limit the amount of soda and sports drinks you have every day. Drink more water when you are thirsty. ¨ Eat at least 5 servings of fruits and vegetables every day. It may seem like a lot, but it is not hard to reach this goal. A serving or helping is 1 piece of fruit, 1 cup of vegetables, or 2 cups of leafy, raw vegetables. Have an apple or some carrot sticks as an afternoon snack instead of a candy bar. Try to have fruits and/or vegetables at every meal. 
· Make exercise part of your daily routine. You may want to start with simple activities, such as walking, bicycling, or slow swimming. Try to be active 30 to 60 minutes every day. You do not need to do all 30 to 60 minutes all at once. For example, you can exercise 3 times a day for 10 or 20 minutes. Moderate exercise is safe for most people, but it is always a good idea to talk to your doctor before starting an exercise program. 
· Keep moving. Ana Janna the lawn, work in the garden, or [employer]. Take the stairs instead of the elevator at work. · If you smoke, quit. People who smoke have an increased risk for heart attack, stroke, cancer, and other lung illnesses. Quitting is hard, but there are ways to boost your chance of quitting tobacco for good. ¨ Use nicotine gum, patches, or lozenges. ¨ Ask your doctor about stop-smoking programs and medicines. ¨ Keep trying. In addition to reducing your risk of diseases in the future, you will notice some benefits soon after you stop using tobacco. If you have shortness of breath or asthma symptoms, they will likely get better within a few weeks after you quit. · Limit how much alcohol you drink. Moderate amounts of alcohol (up to 2 drinks a day for men, 1 drink a day for women) are okay. But drinking too much can lead to liver problems, high blood pressure, and other health problems. Family health If you have a family, there are many things you can do together to improve your health. · Eat meals together as a family as often as possible. · Eat healthy foods. This includes fruits, vegetables, lean meats and dairy, and whole grains. · Include your family in your fitness plan. Most people think of activities such as jogging or tennis as the way to fitness, but there are many ways you and your family can be more active. Anything that makes you breathe hard and gets your heart pumping is exercise. Here are some tips: 
¨ Walk to do errands or to take your child to school or the bus. ¨ Go for a family bike ride after dinner instead of watching TV. Where can you learn more? Go to http://diamond-daniel.info/. Enter X535 in the search box to learn more about \"A Healthy Lifestyle: Care Instructions. \" Current as of: July 26, 2016 Content Version: 11.3 © 1688-5968 [employer]. Care instructions adapted under license by [employer] (which disclaims liability or warranty for this information). If you have questions about a medical condition or this instruction, always ask your healthcare professional. [address] any warranty or liability for your use of this information. Introducing [facility] & HEALTH SERVICES! Dear [facility]: Thank you for requesting a [employer] account. Our records indicate that you already have an active [employer] account. You can access your account anytime at [email or web address] Did you know that you can access your hospital and ER discharge instructions at any time in [employer]? You can also review all of your test results from your hospital stay or ER visit. Additional Information If you have questions, please visit the Frequently Asked Questions section of the [employer] website at [email or web address] Remember, [employer] is NOT to be used for urgent needs. For medical emergencies, dial 911. Now available from your iPhone and Android! Please provide this summary of care documentation to your next provider. Your primary care clinician is listed as Román Mendez. If you have any questions after today's visit, please call [phone].

## 2017-07-06 NOTE — PROGRESS NOTES
Date:             2017    Name:  Marcela Romano  :  1985  MRN:  [id number]     PCP:  Alejandro Brewer DO    Chief Complaint   Patient presents with    Follow-up         HISTORY OF PRESENT ILLNESS:  Yusef Gage is a 33 y.o., female who presents today for follow up for ataxia, vertigo, lower extremity weakness. She was referred back to our office today by her endocrinologist because she has diagnosed her with Hashimoto's encephalopathy. She has been seen an endocrinologist since  when she was put on Synthroid for hypothyroidism, but this diagnosis was only made in the past few weeks due to elevated thyroid antibodies and steroid responsiveness of her neurologic deficits. After being evaluated here as an inpatient in , and being seen in November as a hospital follow-up with normal MRI of brain, cervical spine, thoracic spine, normal EMG/NCS. Negative MG panel. TPO antibodies were negative when checked during October hospitalization. She was seen by a neurologist at [facility], see impression below. She had tilt table testing that confirmed orthostatic hypotension. She went on florinef and also did a course of steroids and her symptoms improved. Symptoms resturned a few weeks ago, legs drag and tingle. Her PCP checked her thyroid antibodies, which she reports were elevated. Symptoms again improved with steroids. When she tapered her prednisone to a dose below 60, her symptoms returned. When her symptoms flareup, she can't walk, her legs are dragging, she can't concentrate.      Yuli 15, 2017 labs  TPO antibody 47 (0-34 iu/mL)  Thyroglobulin antibody 2.6 ((o-0.9 iu/mL)  TSH 1.72  T4 free 1.27    10/2016 inpatient labs  TSH 3.00  TPO ab 28    Pretreatment thyroid evaluation provided by patient  2013  TPO antibody < 6 (0-34 iu/mL)  Antithyroglobulin antibody < 20 (0-40 iu/mL)  TSH 3.45  T4 10.5  T3 uptake 24 (24-39%)  Free thyroxine index 2.5 (1.2-4.9)      Initial evaluation by [facility] Neurology November 2016  Ms. Jerri Allen is a 31yo F with h/o ADHD and depression who presents to clinic as a referral from [facility] Neurology for ataxia and leg weakness. Her neurological exam is significant for mild ataxia on FNF which worsens with eye closure, positive Romberg, and an effortful, variable gait with erratic arm swing. Her constellation of symptoms and exam findings could localize to the cerebellum or to the dorsal column tracts, although there could also be a vestibulopathy component (especially in light of a reported abnormality of her vestibulocochelar nerve). Imaging is grossly normal, although we do not yet have official reads. As of now, the differential diagnosis includes episodic ataxias, vestibulopathies, and functional gait disorder. Our plan is to obtain her formal sleep study report and to refer her to vestibular clinic for potential vestibulography. Had a vestibular evaluation by audiology at [facility] in December 2016  In summary, todays findings revealed several central (non-vestibular) indicators - increased VOR gain, failure of VOR visual fixation suppression, and positional vertical nystagmus with failure of fixation. Caloric testing revealed mildly asymmetric responses with the left side weaker than the right. There was no evidence of BPPV. Patient was counseled regarding these test results; she was instructed to follow-up in Neurology to discuss these findings and further management of her symptoms as they appear to be more central (non-vestibular) in nature    Repeat MRI of brain in January 2017 at [facility] was normal    Saw a sleep medicine provider in April 2017 at [facility]  In summary, this is a woman with chronic daytime hypersomnia, nocturnal sleep-onset insomnia, and an evening phase preference marked enough to prevent morning employment.  The previous testing appears robust and correct, but I note that the MSLT occurred in the morning (with increasing latency through the 4 naps), the PSG featured high efficiency, and so chronic sleep deprivation facilitated by circadian phase delay should remain in the DDX. Idiopathic hypersomnia can occur alone or in combination with circadian phase delay. Narcolepsy remains. The documented mild BREANNE does not appear severe enough, and CPAP ineffective enough, that it is not a strong diagnostic consideration. Repeat sleep study was ordered, patient does not appear to schedule that      11.14.2016 thor Alva is a 28 y.o., female who presents today for follow up for BLE weakness and vertigo. All of this started about 2 years ago after she fell off a chair. Does not recall losing consciousness. Initially she had vertigo, this was very intense a few days after the fall. She was unable to stand or walk without help. This did improve, and in May of this year she was able to go on a 100 mile hike. For a few months, she has been shuffling her feet at work, and she realized that doing so made her very tired. She went to the ER on 10.17.16 for BLE weakness and incoordination of her movements, she would try to move her leg and be unable to do so or she would be unable to perform the movement that she intended. Left foot was dragging. This has improved, but she has noticed that when she walks for a long period it will happen again. After leaving the hospital, it took a few days for her to be able to walk again. Denies headaches, trouble with her arms, numbness. Vision gets blurry when she is tired. She does think that food gets stuck sometimes when she is swallowing. She has sleep apnea and idiopathic hypersomnia, sleeps with a CPAP but does get 8 good hours nightly with a consistent sleep schedule. She is an ICU , is on FMLA right now because she is so excessively tired that she can't work. Wonders if she has myasthenia gravis.  She took a tapering dose of steroids that her PCP gave her, she felt awesome for a few a days and then her symptoms returned. Still gets dizzy, describes it as lightheadedness. This is worst after driving, looking up or lying down, or after eating. If she turns her head side to side this will make her dizzy. Sometimes she has a sensation of weakness shoot down her legs, if she is tired or turns her head side to side this might happen. Also when walking around at work. Had videonystagmography done by an ENT and was told that she had vestibulocochlear dysfunction, PT was recommended but she didn't go. MRI of brain, cervical spine, and lumbar spine during 10.17.16 admission were all within normal limits. Current Outpatient Prescriptions   Medication Sig    methylPREDNISolone (MEDROL DOSEPACK) 4 mg tablet Per dose pack instructions    fludrocortisone (FLORINEF) 0.1 mg tablet Take 2 Tabs by mouth daily.  SYNTHROID 125 mcg tablet TAKE 1 TABLET BY MOUTH DAILY    ADDERALL XR 15 mg XR capsule Take 20 mg by mouth as needed.  sertraline (ZOLOFT) 100 mg tablet Take 100 mg by mouth daily. No current facility-administered medications for this visit. No Known Allergies  Past Medical History:   Diagnosis Date    Depression     Hypothyroid     Hypothyroid     Insomnia     Migraine     Shuffling gait 10/18/2016    --intermittent when head was shaking/bobbing     Vertigo      History reviewed. No pertinent surgical history. Social History     Social History    Marital status: SINGLE     Spouse name: N/A    Number of children: N/A    Years of education: N/A     Occupational History    Not on file.      Social History Main Topics    Smoking status: Never Smoker    Smokeless tobacco: Never Used    Alcohol use Yes      Comment: rarely    Drug use: No    Sexual activity: Not on file     Other Topics Concern    Not on file     Social History Narrative     Family History   Problem Relation Age of Onset    Heart Disease Maternal Grandfather     Cancer Paternal Grandmother     Heart Disease Paternal Grandmother          PHYSICAL EXAMINATION:    General:  Well defined, nourished, and groomed individual in no acute distress. Neck: Supple, nontender, no bruits, no pain with resistance to active range of motion. Heart: Regular rate and rhythm, no murmurs, rub, or gallop. Normal S1S2. Lungs:  Clear to auscultation bilaterally with equal chest expansion, no cough, no wheeze  Musculoskeletal:  Extremities revealed no edema and had full range of motion of joints. Psych:  Good mood and bright affect    NEUROLOGICAL EXAMINATION:     Mental Status:   Alert and oriented to person, place, and time with recent and remote memory intact. Attention span and concentration are normal. Speech is fluent with a full fund of knowledge. Cranial Nerves:    II, III, IV, VI:  Visual acuity grossly intact. Pupils are equal, round, and reactive to light. Extra-ocular movements are full and fluid. No ptosis or nystagmus. V-XII: Hearing is grossly intact. Facial features are symmetric, with normal sensation and strength. The palate rises symmetrically and the tongue protrudes midline. Sternocleidomastoids 5/5. Motor Examination: Normal tone, bulk, and strength, 5/5 muscle strength throughout. Coordination:  Finger to nose testing was normal.   No resting or intention tremor  Gait and Station:  Steady while walking, left leg drag. Normal arm swing. No pronator drift. No muscle wasting or fasciculations noted. ASSESSMENT AND PLAN    ICD-10-CM ICD-9-CM    1. Hashimoto encephalopathy E06.3 348.39 EEG    G93.49 245.2 methylPREDNISolone (MEDROL DOSEPACK) 4 mg tablet      EEG   2. Ataxia R27.0 781.3    3. Orthostatic hypotension I95.1 458.0    4. Idiopathic hypersomnia G47.11 780.54      52-year-old female seen in follow-up for above. She was first seen by our office in the fall for ataxia, lower extremity weakness and vertigo, worse with activity.   MRIs were unremarkable, EMG/NCS normal, myasthenia gravis negative. She was worked up at [facility], it was determined that she has orthostatic hypotension by tilt table test.  MRI of brain was done again January, this was normal.  She was also seen for hypersomnia by a neurologist at [facility], repeat sleep study was ordered but not done. This is improved on Florinef, but she continues to have symptoms of leg heaviness and ataxia. She has been treated for hypothyroidism since 2013, thyroid antibodies were normal up until they were checked in the hospital in October 2016. When checked more recently, TPO antibody and thyroglobulin antibody were elevated. Her PCP gave her a course of prednisone, which helped as long as she stayed on a dose above 60 mg.  Because elevated thyroid antibodies, and resolution of neurologic symptoms with steroids of her PCP and endocrinologist have diagnosed her with Hashimoto encephalitis and sent her to us for treatment. 1.  Discussed the case with Dr. Lamar Garcia at [facility], who agreed that a referral to their team is appropriate for further evaluation and possible treatment of Hashimoto encephalitis if confirmed  2. We will get EEG now  3. We will do Medrol Dosepak to give her some symptom relief for the time being, am reluctant to try higher dose steroids until she is evaluated by [facility]   4. Continue Florinef for orthostatic hypertension     she can follow-up as needed    Veronica Berrios NP    This note was created using voice recognition software. Despite editing, there may be syntax errors.

## 2017-07-06 NOTE — LETTER
2017 3:21 PM 
 
RE:    Belai Ingram  
[address] [place] [id number] Thank you for agreeing to see [place] I am referring my patient to you for evaluation of Neurology. Please see her 
pertinent patient information below. Date:             2017 Name:  Pranay Leon 
:  1985 MRN:  [id number] PCP:  Jr Dodd DO Chief Complaint Patient presents with  Follow-up HISTORY OF PRESENT ILLNESS: 
Belia Ingram is a 33 y.o., female who presents today for follow up for ataxia, vertigo, lower extremity weakness. She was referred back to our office today by her endocrinologist because she has diagnosed her with Hashimoto's encephalopathy. She has been seen an endocrinologist since  when she was put on Synthroid for hypothyroidism, but this diagnosis was only made in the past few weeks due to elevated thyroid antibodies and steroid responsiveness of her neurologic deficits. After being evaluated here as an inpatient in , and being seen in November as a hospital follow-up with normal MRI of brain, cervical spine, thoracic spine, normal EMG/NCS. Negative MG panel. TPO antibodies were negative when checked during October hospitalization. She was seen by a neurologist at [facility], see impression below. She had tilt table testing that confirmed orthostatic hypotension. She went on florinef and also did a course of steroids and her symptoms improved. Symptoms resturned a few weeks ago, legs drag and tingle. Her PCP checked her thyroid antibodies, which she reports were elevated. Symptoms again improved with steroids. When she tapered her prednisone to a dose below 60, her symptoms returned. When her symptoms flareup, she can't walk, her legs are dragging, she can't concentrate. Yuli 15, 2017 labs TPO antibody 47 (0-34 iu/mL) Thyroglobulin antibody 2.6 ((o-0.9 iu/mL) TSH 1.72 T4 free 1.27 
 
10/2016 inpatient labs TSH 3.00 
TPO ab 28 Pretreatment thyroid evaluation provided by patient January 2, 2013 TPO antibody < 6 (0-34 iu/mL) Antithyroglobulin antibody < 20 (0-40 iu/mL) TSH 3.45 
T4 10.5 T3 uptake 24 (24-39%) Free thyroxine index 2.5 (1.2-4.9) Initial evaluation by [facility] Neurology November 2016 Ms. Adela Brito is a 31yo F with h/o ADHD and depression who presents to clinic as a referral from [facility] for ataxia and leg weakness. Her neurological exam is significant for mild ataxia on FNF which worsens with eye closure, positive Romberg, and an effortful, variable gait with erratic arm swing. Her constellation of symptoms and exam findings could localize to the cerebellum or to the dorsal column tracts, although there could also be a vestibulopathy component (especially in light of a reported abnormality of her vestibulocochelar nerve). Imaging is grossly normal, although we do not yet have official reads. As of now, the differential diagnosis includes episodic ataxias, vestibulopathies, and functional gait disorder. Our plan is to obtain her formal sleep study report and to refer her to vestibular clinic for potential vestibulography. Had a vestibular evaluation by audiology at [facility] in December 2016 In summary, todays findings revealed several central (non-vestibular) indicators - increased VOR gain, failure of VOR visual fixation suppression, and positional vertical nystagmus with failure of fixation. Caloric testing revealed mildly asymmetric responses with the left side weaker than the right. There was no evidence of BPPV. Patient was counseled regarding these test results; she was instructed to follow-up in Neurology to discuss these findings and further management of her symptoms as they appear to be more central (non-vestibular) in nature Repeat MRI of brain in January 2017 at [facility] was normal 
 
Saw a sleep medicine provider in April 2017 at [facility] In summary, this is a woman with chronic daytime hypersomnia, nocturnal sleep-onset insomnia, and an evening phase preference marked enough to prevent morning employment. The previous testing appears robust and correct, but I note that the MSLT occurred in the morning (with increasing latency through the 4 naps), the PSG featured high efficiency, and so chronic sleep deprivation facilitated by circadian phase delay should remain in the DDX. Idiopathic hypersomnia can occur alone or in combination with circadian phase delay. Narcolepsy remains. The documented mild BREANNE does not appear severe enough, and CPAP ineffective enough, that it is not a strong diagnostic consideration. Repeat sleep study was ordered, patient does not appear to schedule that 
 
 
11.14.2016 thor Benavides is a 28 y.o., female who presents today for follow up for BLE weakness and vertigo. All of this started about 2 years ago after she fell off a chair. Does not recall losing consciousness. Initially she had vertigo, this was very intense a few days after the fall. She was unable to stand or walk without help. This did improve, and in May of this year she was able to go on a 100 mile hike. For a few months, she has been shuffling her feet at work, and she realized that doing so made her very tired. She went to the ER on 10.17.16 for BLE weakness and incoordination of her movements, she would try to move her leg and be unable to do so or she would be unable to perform the movement that she intended. Left foot was dragging. This has improved, but she has noticed that when she walks for a long period it will happen again. After leaving the hospital, it took a few days for her to be able to walk again. Denies headaches, trouble with her arms, numbness. Vision gets blurry when she is tired. She does think that food gets stuck sometimes when she is swallowing.  She has sleep apnea and idiopathic hypersomnia, sleeps with a CPAP but does get 8 good hours nightly with a consistent sleep schedule. She is an ICU , is on FMLA right now because she is so excessively tired that she can't work. Wonders if she has myasthenia gravis. She took a tapering dose of steroids that her PCP gave her, she felt awesome for a few a days and then her symptoms returned. Still gets dizzy, describes it as lightheadedness. This is worst after driving, looking up or lying down, or after eating. If she turns her head side to side this will make her dizzy. Sometimes she has a sensation of weakness shoot down her legs, if she is tired or turns her head side to side this might happen. Also when walking around at work. Had videonystagmography done by an ENT and was told that she had vestibulocochlear dysfunction, PT was recommended but she didn't go. MRI of brain, cervical spine, and lumbar spine during 10.17.16 admission were all within normal limits. Current Outpatient Prescriptions Medication Sig  
 methylPREDNISolone (MEDROL DOSEPACK) 4 mg tablet Per dose pack instructions  fludrocortisone (FLORINEF) 0.1 mg tablet Take 2 Tabs by mouth daily.  SYNTHROID 125 mcg tablet TAKE 1 TABLET BY MOUTH DAILY  ADDERALL XR 15 mg XR capsule Take 20 mg by mouth as needed.  sertraline (ZOLOFT) 100 mg tablet Take 100 mg by mouth daily. No current facility-administered medications for this visit. No Known Allergies Past Medical History:  
Diagnosis Date  Depression  Hypothyroid  Hypothyroid  Insomnia  Migraine  Shuffling gait 10/18/2016  
 --intermittent when head was shaking/bobbing  Vertigo History reviewed. No pertinent surgical history. Social History Social History  Marital status: SINGLE Spouse name: N/A  
 Number of children: N/A  
 Years of education: N/A Occupational History  Not on file. Social History Main Topics  Smoking status: Never Smoker  Smokeless tobacco: Never Used  Alcohol use Yes Comment: rarely  Drug use: No  
 Sexual activity: Not on file Other Topics Concern  Not on file Social History Narrative Family History Problem Relation Age of Onset  Heart Disease Maternal Grandfather  Cancer Paternal Grandmother  Heart Disease Paternal Grandmother PHYSICAL EXAMINATION:   
General:  Well defined, nourished, and groomed individual in no acute distress. Neck: Supple, nontender, no bruits, no pain with resistance to active range of motion. Heart: Regular rate and rhythm, no murmurs, rub, or gallop. Normal S1S2. Lungs:  Clear to auscultation bilaterally with equal chest expansion, no cough, no wheeze Musculoskeletal:  Extremities revealed no edema and had full range of motion of joints. Psych:  Good mood and bright affect NEUROLOGICAL EXAMINATION:    
Mental Status:   Alert and oriented to person, place, and time with recent and remote memory intact. Attention span and concentration are normal. Speech is fluent with a full fund of knowledge. Cranial Nerves:   
II, III, IV, VI:  Visual acuity grossly intact. Pupils are equal, round, and reactive to light. Extra-ocular movements are full and fluid. No ptosis or nystagmus. V-XII: Hearing is grossly intact. Facial features are symmetric, with normal sensation and strength. The palate rises symmetrically and the tongue protrudes midline. Sternocleidomastoids 5/5. Motor Examination: Normal tone, bulk, and strength, 5/5 muscle strength throughout. Coordination:  Finger to nose testing was normal.   No resting or intention tremor Gait and Station:  Steady while walking, left leg drag. Normal arm swing. No pronator drift. No muscle wasting or fasciculations noted. ASSESSMENT AND PLAN 
  ICD-10-CM ICD-9-CM 1.  Hashimoto encephalopathy E06.3 348.39 EEG  
 G93.49 245.2 methylPREDNISolone (MEDROL DOSEPACK) 4 mg tablet EEG 2. Ataxia R27.0 781.3 3. Orthostatic hypotension I95.1 458.0 4. Idiopathic hypersomnia G47.11 780.54   
 
36-year-old female seen in follow-up for above. She was first seen by our office in the fall for ataxia, lower extremity weakness and vertigo, worse with activity. MRIs were unremarkable, EMG/NCS normal, myasthenia gravis negative. She was worked up at [facility], it was determined that she has orthostatic hypotension by tilt table test.  MRI of brain was done again January, this was normal.  She was also seen for hypersomnia by a neurologist at [facility], repeat sleep study was ordered but not done. This is improved on Florinef, but she continues to have symptoms of leg heaviness and ataxia. She has been treated for hypothyroidism since 2013, thyroid antibodies were normal up until they were checked in the hospital in October 2016. When checked more recently, TPO antibody and thyroglobulin antibody were elevated. Her PCP gave her a course of prednisone, which helped as long as she stayed on a dose above 60 mg.  Because elevated thyroid antibodies, and resolution of neurologic symptoms with steroids of her PCP and endocrinologist have diagnosed her with Hashimoto encephalitis and sent her to us for treatment. 1.  Discussed the case with Dr. Glen Miranda at [facility], who agreed that a referral to their team is appropriate for further evaluation and possible treatment of Hashimoto encephalitis if confirmed 2. We will get EEG now 3. We will do Medrol Dosepak to give her some symptom relief for the time being, am reluctant to try higher dose steroids until she is evaluated by [facility] 4. Continue Florinef for orthostatic hypertension 
 
 she can follow-up as needed Kimani Gruber NP This note was created using voice recognition software. Despite editing, there may be syntax errors. I appreciate your assistance in Ms. Patel's care  and look forward to your findings and recommendations. Sincerely, Ramón Blakely NP Patient Registration [address] [facility] Contact Information Patient ID: [id number] Last Name: [facility] Name: Lamar Myles First Name: Sushant Boland Phone: [phone] Middle Name: L Employer Information Sex: F YOB: 1985 Name: [employer] Social Security No.: [id number] Phone: 
Address: [facility] [employer]  Guarantor Information (to whom statements are sent) Zip: [phone] Name: Angela Lei City: [facility]: [place] Address: [address] [place] [address] Home Phone: [phone] Phone: ( ) _______ - ______________ Work Phone: [phone] Other: 
Mobile Phone: [phone] Patient Referred by: ______________________ Marital Status: SINGLE Patient PCP: ________________________ Primary Insurance Information Insurance Plan Name: [employer] [employer] ([place]) Address to [address] [place] [address] Insurance Phone Number: [phone] [facility] Policy Information Policy Woo Patient's relationship to policy woo: Self Last Name:BANG 
ID/Certification No.: [id number] First Name:[employer] Policy/Group KK.:[id number] Middle Name: NO Issue Date: 05/01/2014 Address:[address] [place] [address] Exp Date: City:[place] JFX:[id number] Copay Amount: ___________________ Social Sec Number: [id number] Co-insurance Percent:__________________________________ YOB: 1985 Sex: ______________ Employer: [employer]

## 2017-07-14 ENCOUNTER — TELEPHONE (OUTPATIENT)
Dept: NEUROLOGY | Age: 32
End: 2017-07-14

## 2017-07-14 NOTE — TELEPHONE ENCOUNTER
----- Message from Zahra Turpin sent at 7/14/2017 12:20 PM EDT -----  Regarding: Gustavo Cartwright, NP/Telephone  Pt would like a return phone call from the nurse to discus getting the EEG scheduled. Pt has not heard from central scheduling yet. Pt can be reached at [phone].

## 2017-07-26 ENCOUNTER — HOSPITAL ENCOUNTER (OUTPATIENT)
Dept: NEUROLOGY | Age: 32
Discharge: HOME | End: 2017-07-26
Attending: NURSE PRACTITIONER
Payer: COMMERCIAL

## 2017-07-26 DIAGNOSIS — G93.49 HASHIMOTO ENCEPHALOPATHY: ICD-10-CM

## 2017-07-26 DIAGNOSIS — E06.3 HASHIMOTO ENCEPHALOPATHY: ICD-10-CM

## 2017-07-26 PROCEDURE — 95816 EEG AWAKE AND DROWSY: CPT

## 2017-08-04 ENCOUNTER — TELEPHONE (OUTPATIENT)
Dept: NEUROLOGY | Age: 32
End: 2017-08-04

## 2017-08-04 NOTE — TELEPHONE ENCOUNTER
----- Message from Chepe Kerr sent at 8/4/2017 12:58 PM EDT -----  Regarding: JUNAID Liang/Telephone  Pt requesting a call from nurse regards EEG test results. Best contact [phone].

## 2017-08-07 NOTE — PROCEDURES
[facility]   [address] [facility], [address]   ROUTINE EEG REPORT       Name:  Julio Kyle   MR#:  [id number]   :  1985   Account #:  [de-identified]    Date of Procedure:  2017   Date of Adm:  2017       REASON FOR EEG: Presented for evaluation of encephalopathy on   the commentary part of the request sheet. DESCRIPTION: Routine scalp leads were applied according to the 10-  20 International system. EKG monitoring as well. The basic background rhythm was 8+ Hz in an alert setting. Very   reasonable, otherwise normal modulation of this background activity   with eye opening and eye closure. No evidence of focal slowing or   spontaneous electrocerebral discharges. EKG grossly sinus rhythm. Hyperventilation induced following excellent patient effort and uniform   elevation and background activity of higher theta that persisted for   about 2 minutes of post hyperventilation and then returned to her   baseline. Photic stimulation produced a well-defined photic drive of   different harmonics. In the earlier part of the recording, the technician   notes patient commentary on several occasions with dizziness that   was precipitated by change of head position. However, none of these   episodes demonstrated any 1:1 correlation with EEG or EKG activities. Additionally, there is no other reference from the technician to any   untoward patient behavior, mannerisms, movement or vocalizations. IMPRESSION: This is a normal awake routine EEG as described with   normal response to provocative maneuvers such as hyperventilation   and photic stimulation. Commentary from technician quoting patient as   dizziness that was precipitated by simple head movements, but there   is no 1:1 correlation with any EEG or EKG findings that are temporally   related. Clinical correlation is advised.          Swapnil Domínguez MD      [facility] / [facility]   D:  2017 12:19   T:  08/07/2017   14:52   Job #:  [id number]

## 2017-08-07 NOTE — TELEPHONE ENCOUNTER
It was [address] [place] [address], not [facility]. I called Raquel Russ and she will have it read today.

## 2017-08-07 NOTE — TELEPHONE ENCOUNTER
This has not been read, appears to have been done at [facility]. Please let her know that I will have to ask the doctors over there to read it, she can call back tomorrow and see if it is been done.

## 2017-10-18 DIAGNOSIS — R42 DIZZINESS: ICD-10-CM

## 2017-10-18 RX ORDER — FLUDROCORTISONE ACETATE 0.1 MG/1
0.2 TABLET ORAL DAILY
Qty: 180 TAB | Refills: 1 | Status: SHIPPED | OUTPATIENT
Start: 2017-10-18 | End: 2018-06-21 | Stop reason: SDUPTHER

## 2017-10-18 NOTE — TELEPHONE ENCOUNTER
Requested Prescriptions     Signed Prescriptions Disp Refills    fludrocortisone (FLORINEF) 0.1 mg tablet 180 Tab 1     Sig: Take 2 Tabs by mouth daily. Authorizing Provider: Jayjay Coleman     Ordering User: Mee Carvajal     Please call to schedule a follow up appointment.

## 2018-01-02 ENCOUNTER — PATIENT MESSAGE (OUTPATIENT)
Dept: CARDIOLOGY CLINIC | Age: 33
End: 2018-01-02

## 2018-01-02 NOTE — TELEPHONE ENCOUNTER
Called patient. ID verified using two patient identifiers, patient states she has been having lightheadedness, faint feeling and shortness of breath. She is taking all medications as prescribed except she has stopped her Sertraline. Appointment scheduled with Dr. Dalila Romero for Friday 1/5/18 at 9 am. Patient advised to seek immediate medical attention if symptoms worsen or new symptoms arise. Patient verbalizes understanding and is agreeable to this plan.

## 2018-01-02 NOTE — TELEPHONE ENCOUNTER
From: Ashlyn Patel  To: Sourav Burger MD  Sent: 1/2/2018 9:32 AM EST  Subject: Prescription Question    Thank you for calling in the [facility] refill. As stated in my previous message, I do think I need to be on Northera at this point. Would you be able to call it in, or do I need an appointment? Thank you.

## 2018-01-03 NOTE — PROGRESS NOTES
HISTORY OF PRESENTING ILLNESS      Chema Lin is a 33 y.o. female with reports of intermittent episodes of shortness of breath, fatigue, ataxia and presyncope that occur when she is walking. She has only experienced one episode of syncope approximately 10 years ago that was unrelated to her current symptoms. Her urine is concentrated. Above symptoms started after fall which caused head trauma in January 2015 for which she presented to ER. She began experiencing symptoms of vertigo which then progressed to ataxia. All testing at the hospital at that time was normal. She also reports history of orthostatic hypotension. She underwent tilt table testing that was positive for orthostatic hypotension and a trial of Florinef was started. We also encouraged adequate hydration and compression stockings. She experienced improvement with increasing florinef dose to 0.2 mg and we added a trial of midodrine. She now presents for follow up. She was intolerant of midodrine due to chest discomfort. She remains orthostatic on vitals today as well as symptomatic. ACTIVE PROBLEM LIST     Patient Active Problem List    Diagnosis Date Noted    Shuffling gait 10/18/2016    Migraine with aura, without mention of intractable migraine without mention of status migrainosus 03/31/2015    Memory loss 03/31/2015    Ataxia 03/31/2015    Sleep apnea 03/31/2015    ADHD (attention deficit hyperactivity disorder) 03/31/2015    B12 deficiency 03/31/2015    Osteoporosis 03/31/2015    Dizziness 01/23/2015           PAST MEDICAL HISTORY     Past Medical History:   Diagnosis Date    Depression     Hypothyroid     Hypothyroid     Insomnia     Migraine     Shuffling gait 10/18/2016    --intermittent when head was shaking/bobbing     Vertigo            PAST SURGICAL HISTORY     No past surgical history on file.        ALLERGIES     No Known Allergies       FAMILY HISTORY     Family History   Problem Relation Age of Onset    Heart Disease Maternal Grandfather     Cancer Paternal Grandmother     Heart Disease Paternal Grandmother     negative for cardiac disease       SOCIAL HISTORY     Social History     Social History    Marital status: SINGLE     Spouse name: N/A    Number of children: N/A    Years of education: N/A     Social History Main Topics    Smoking status: Never Smoker    Smokeless tobacco: Never Used    Alcohol use Yes      Comment: rarely    Drug use: No    Sexual activity: Not on file     Other Topics Concern    Not on file     Social History Narrative         MEDICATIONS     Current Outpatient Prescriptions   Medication Sig    fludrocortisone (FLORINEF) 0.1 mg tablet Take 2 Tabs by mouth daily.  methylPREDNISolone (MEDROL DOSEPACK) 4 mg tablet Per dose pack instructions    SYNTHROID 125 mcg tablet TAKE 1 TABLET BY MOUTH DAILY    ADDERALL XR 15 mg XR capsule Take 20 mg by mouth as needed.  sertraline (ZOLOFT) 100 mg tablet Take 100 mg by mouth daily. No current facility-administered medications for this visit. I have reviewed the nurses notes, vitals, problem list, allergy list, medical history, family, social history and medications. REVIEW OF SYMPTOMS      General: Pt denies excessive weight gain or loss. Pt is able to conduct ADL's  HEENT: Denies blurred vision, headaches, hearing loss, epistaxis and difficulty swallowing. Respiratory: Denies cough, congestion, shortness of breath, WOODS, wheezing or stridor.   Cardiovascular: Denies precordial pain, palpitations, edema or PND  Gastrointestinal: Denies poor appetite, indigestion, abdominal pain or blood in stool  Genitourinary: Denies hematuria, dysuria, increased urinary frequency  Musculoskeletal: Denies joint pain or swelling from muscles or joints  Neurologic: Denies tremor, paresthesias, headache, or sensory motor disturbance  Psychiatric: Denies confusion, insomnia, depression  Integumentray: Denies rash, itching or ulcers. Hematologic: Denies easy bruising, bleeding     PHYSICAL EXAMINATION      There were no vitals filed for this visit. General: Well developed, in no acute distress. HEENT: No jaundice, oral mucosa moist, no oral ulcers  Neck: Supple, no stiffness, no lymphadenopathy, supple  Heart:  Normal S1/S2 negative S3 or S4. Regular, no murmur, gallop or rub, no jugular venous distention  Respiratory: Clear bilaterally x 4, no wheezing or rales  Abdomen:   Soft, non-tender, bowel sounds are active.   Extremities:  No edema, normal cap refill, no cyanosis. Musculoskeletal: No clubbing, no deformities  Neuro: A&Ox3, speech clear, gait stable, cooperative, no focal neurologic deficits  Skin: Skin color is normal. No rashes or lesions. Non diaphoretic, moist.  Vascular: 2+ pulses symmetric in all extremities       DIAGNOSTIC DATA      EKG:        LABORATORY DATA      Lab Results   Component Value Date/Time    WBC 4.9 10/18/2016 06:26 AM    HGB 11.9 10/18/2016 06:26 AM    HCT 36.6 10/18/2016 06:26 AM    PLATELET 141 71/45/9910 06:26 AM    MCV 88.4 10/18/2016 06:26 AM      Lab Results   Component Value Date/Time    Sodium 142 10/18/2016 06:26 AM    Potassium 3.7 10/18/2016 06:26 AM    Chloride 108 10/18/2016 06:26 AM    CO2 25 10/18/2016 06:26 AM    Anion gap 9 10/18/2016 06:26 AM    Glucose 87 10/18/2016 06:26 AM    BUN 9 10/18/2016 06:26 AM    Creatinine 0.68 10/18/2016 06:26 AM    BUN/Creatinine ratio 13 10/18/2016 06:26 AM    GFR est AA >60 10/18/2016 06:26 AM    GFR est non-AA >60 10/18/2016 06:26 AM    Calcium 7.8 10/18/2016 06:26 AM    Bilirubin, total 0.4 10/17/2016 04:49 PM    AST (SGOT) 18 10/17/2016 04:49 PM    Alk. phosphatase 34 10/17/2016 04:49 PM    Protein, total 8.1 10/17/2016 04:49 PM    Albumin 4.0 10/17/2016 04:49 PM    Globulin 4.1 10/17/2016 04:49 PM    A-G Ratio 1.0 10/17/2016 04:49 PM    ALT (SGPT) 22 10/17/2016 04:49 PM           ASSESSMENT      1. Orthostatic hypotension  2. Presyncope  3.  Ataxia PLAN     We will start a trial of northera. Continue hydration. If symptoms fail to rianna with this drug regimen will consider transitioning from knee-high compression socks to waist high compression socks. Encouraged lower extremity conditioning. FOLLOW-UP     1 month        Thank you,  Shawn Colon DO and Dr. Surekha Mayo for involving me in the care of this extraordinarily pleasant female. Please do not hesitate to contact me for further questions/concerns.          Rubia Schneider MD  Cardiac Electrophysiology / Cardiology    [facility] 92.  [address] [facility], [facility], [address]    [address], [address] [facility] [address]  [phone] / [phone] Fax   [phone] / [phone] Fax

## 2018-01-05 ENCOUNTER — OFFICE VISIT (OUTPATIENT)
Dept: CARDIOLOGY CLINIC | Age: 33
End: 2018-01-05

## 2018-01-05 VITALS — BODY MASS INDEX: 21.66 KG/M2 | HEIGHT: 67 IN | OXYGEN SATURATION: 98 % | WEIGHT: 138 LBS

## 2018-01-05 DIAGNOSIS — R42 DIZZINESS: Primary | ICD-10-CM

## 2018-01-05 PROBLEM — I95.1 ORTHOSTATIC HYPOTENSION: Status: ACTIVE | Noted: 2018-01-05

## 2018-01-05 NOTE — PROGRESS NOTES
Extended / Orthostatic Vitals:  Patient Position 2: Supine  BP 2: 118/88  Pulse 2: 67  Patient Position 3: Sitting  BP 3: 110/60  Pulse 3: 74  Patient Position 4: Standing  BP 4: 100/80  Pulse 4: 75    Visit Vitals    Ht 5' 7\" (1.702 m)    Wt 138 lb (62.6 kg)    SpO2 98%    BMI 21.61 kg/m2

## 2018-01-11 ENCOUNTER — DOCUMENTATION ONLY (OUTPATIENT)
Dept: CARDIOLOGY CLINIC | Age: 33
End: 2018-01-11

## 2018-01-11 NOTE — PROGRESS NOTES
Received letter from Con Magana that [facility] prescription has been triaged to [facility]. No prior authorization is needed. And [facility] coverage exists via [facility].

## 2018-01-12 ENCOUNTER — TELEPHONE (OUTPATIENT)
Dept: CARDIOLOGY CLINIC | Age: 33
End: 2018-01-12

## 2018-01-12 NOTE — TELEPHONE ENCOUNTER
Called patient, no answer, left message to have her return call to [phone]. Patient questioning if it's ok to take Adderall or Ritalin with Northera. Per CAROLYNE Pizano NP \"Ok to take either with Northera, no interactions identified. \"

## 2018-01-12 NOTE — TELEPHONE ENCOUNTER
Pt calling because her new medication Adonica Fus came in and she wants to know if she can take Adderall with this medication. Pt would also like to know if she can switch to Ritalin when she's done with her Adderall. Please give her a call back @ [phone]. Thanks!   Milton Olmos

## 2018-01-15 ENCOUNTER — TELEPHONE (OUTPATIENT)
Dept: CARDIOLOGY CLINIC | Age: 33
End: 2018-01-15

## 2018-01-15 NOTE — TELEPHONE ENCOUNTER
Patient is calling because she states that her medication is making her hands and feet tingle. Patient does not remember name of medication, but will have it when she is called back. Patient can be reached at [phone]. Thanks !

## 2018-01-17 RX ORDER — DROXIDOPA 200 MG/1
200 CAPSULE ORAL 3 TIMES DAILY
Qty: 90 CAP | Refills: 2
Start: 2018-01-17 | End: 2018-02-16

## 2018-01-17 NOTE — TELEPHONE ENCOUNTER
Requested Prescriptions     Signed Prescriptions Disp Refills    droxidopa (NORTHERA) 200 mg cap 90 Cap 2     Sig: Take 200 mg by mouth three (3) times daily. Authorizing Provider: Sonu Batista     Ordering User: Reynaldo Sanches     Paper prescription faxed to [facility] at fax # [phone].

## 2018-02-15 ENCOUNTER — HOSPITAL ENCOUNTER (INPATIENT)
Age: 33
LOS: 1 days | Discharge: HOME | DRG: 881 | End: 2018-02-17
Attending: EMERGENCY MEDICINE | Admitting: PSYCHIATRY & NEUROLOGY
Payer: COMMERCIAL

## 2018-02-15 DIAGNOSIS — T42.3X3A: ICD-10-CM

## 2018-02-15 DIAGNOSIS — F32.2 SEVERE SINGLE CURRENT EPISODE OF MAJOR DEPRESSIVE DISORDER, WITHOUT PSYCHOTIC FEATURES (HCC): ICD-10-CM

## 2018-02-15 DIAGNOSIS — R41.82 ALTERED MENTAL STATUS, UNSPECIFIED ALTERED MENTAL STATUS TYPE: Primary | ICD-10-CM

## 2018-02-15 PROCEDURE — 96360 HYDRATION IV INFUSION INIT: CPT

## 2018-02-15 PROCEDURE — 80307 DRUG TEST PRSMV CHEM ANLYZR: CPT | Performed by: EMERGENCY MEDICINE

## 2018-02-15 PROCEDURE — 93005 ELECTROCARDIOGRAM TRACING: CPT

## 2018-02-15 PROCEDURE — 99285 EMERGENCY DEPT VISIT HI MDM: CPT

## 2018-02-15 PROCEDURE — 80184 ASSAY OF PHENOBARBITAL: CPT | Performed by: EMERGENCY MEDICINE

## 2018-02-15 PROCEDURE — 80053 COMPREHEN METABOLIC PANEL: CPT | Performed by: EMERGENCY MEDICINE

## 2018-02-15 PROCEDURE — 85025 COMPLETE CBC W/AUTO DIFF WBC: CPT | Performed by: EMERGENCY MEDICINE

## 2018-02-15 PROCEDURE — 90791 PSYCH DIAGNOSTIC EVALUATION: CPT

## 2018-02-16 ENCOUNTER — APPOINTMENT (OUTPATIENT)
Dept: CT IMAGING | Age: 33
DRG: 881 | End: 2018-02-16
Attending: EMERGENCY MEDICINE
Payer: COMMERCIAL

## 2018-02-16 VITALS
OXYGEN SATURATION: 100 % | SYSTOLIC BLOOD PRESSURE: 88 MMHG | WEIGHT: 144 LBS | DIASTOLIC BLOOD PRESSURE: 53 MMHG | BODY MASS INDEX: 22.6 KG/M2 | HEIGHT: 67 IN | RESPIRATION RATE: 16 BRPM | TEMPERATURE: 98.1 F | HEART RATE: 85 BPM

## 2018-02-16 PROBLEM — F32.9 MAJOR DEPRESSION: Status: ACTIVE | Noted: 2018-02-16

## 2018-02-16 LAB
ALBUMIN SERPL-MCNC: 3.4 G/DL (ref 3.5–5)
ALBUMIN/GLOB SERPL: 1 {RATIO} (ref 1.1–2.2)
ALP SERPL-CCNC: 27 U/L (ref 45–117)
ALT SERPL-CCNC: 18 U/L (ref 12–78)
AMPHET UR QL SCN: POSITIVE
ANION GAP SERPL CALC-SCNC: 9 MMOL/L (ref 5–15)
APAP SERPL-MCNC: <2 UG/ML (ref 10–30)
APPEARANCE UR: CLEAR
AST SERPL-CCNC: 8 U/L (ref 15–37)
ATRIAL RATE: 57 BPM
BACTERIA URNS QL MICRO: NEGATIVE /HPF
BARBITURATES UR QL SCN: POSITIVE
BASOPHILS # BLD: 0 K/UL (ref 0–0.1)
BASOPHILS NFR BLD: 0 % (ref 0–1)
BENZODIAZ UR QL: NEGATIVE
BILIRUB SERPL-MCNC: 0.7 MG/DL (ref 0.2–1)
BILIRUB UR QL: NEGATIVE
BUN SERPL-MCNC: 14 MG/DL (ref 6–20)
BUN/CREAT SERPL: 22 (ref 12–20)
CALCIUM SERPL-MCNC: 8.3 MG/DL (ref 8.5–10.1)
CALCULATED P AXIS, ECG09: 49 DEGREES
CALCULATED R AXIS, ECG10: 70 DEGREES
CALCULATED T AXIS, ECG11: 54 DEGREES
CANNABINOIDS UR QL SCN: POSITIVE
CHLORIDE SERPL-SCNC: 104 MMOL/L (ref 97–108)
CO2 SERPL-SCNC: 27 MMOL/L (ref 21–32)
COCAINE UR QL SCN: NEGATIVE
COLOR UR: ABNORMAL
CREAT SERPL-MCNC: 0.64 MG/DL (ref 0.55–1.02)
DIAGNOSIS, 93000: NORMAL
DIFFERENTIAL METHOD BLD: NORMAL
DRUG SCRN COMMENT,DRGCM: ABNORMAL
EOSINOPHIL # BLD: 0.2 K/UL (ref 0–0.4)
EOSINOPHIL NFR BLD: 4 % (ref 0–7)
EPITH CASTS URNS QL MICRO: ABNORMAL /LPF
ERYTHROCYTE [DISTWIDTH] IN BLOOD BY AUTOMATED COUNT: 13.4 % (ref 11.5–14.5)
ETHANOL SERPL-MCNC: <10 MG/DL
GLOBULIN SER CALC-MCNC: 3.4 G/DL (ref 2–4)
GLUCOSE SERPL-MCNC: 68 MG/DL (ref 65–100)
GLUCOSE UR STRIP.AUTO-MCNC: NEGATIVE MG/DL
HCG UR QL: NEGATIVE
HCT VFR BLD AUTO: 39 % (ref 35–47)
HGB BLD-MCNC: 12.9 G/DL (ref 11.5–16)
HGB UR QL STRIP: NEGATIVE
HYALINE CASTS URNS QL MICRO: ABNORMAL /LPF (ref 0–5)
IMM GRANULOCYTES # BLD: 0 K/UL (ref 0–0.04)
IMM GRANULOCYTES NFR BLD AUTO: 0 % (ref 0–0.5)
KETONES UR QL STRIP.AUTO: ABNORMAL MG/DL
LEUKOCYTE ESTERASE UR QL STRIP.AUTO: NEGATIVE
LYMPHOCYTES # BLD: 2 K/UL (ref 0.8–3.5)
LYMPHOCYTES NFR BLD: 37 % (ref 12–49)
MCH RBC QN AUTO: 29.7 PG (ref 26–34)
MCHC RBC AUTO-ENTMCNC: 33.1 G/DL (ref 30–36.5)
MCV RBC AUTO: 89.9 FL (ref 80–99)
METHADONE UR QL: NEGATIVE
MONOCYTES # BLD: 0.5 K/UL (ref 0–1)
MONOCYTES NFR BLD: 8 % (ref 5–13)
NEUTS SEG # BLD: 2.8 K/UL (ref 1.8–8)
NEUTS SEG NFR BLD: 51 % (ref 32–75)
NITRITE UR QL STRIP.AUTO: NEGATIVE
NRBC # BLD: 0 K/UL (ref 0–0.01)
NRBC BLD-RTO: 0 PER 100 WBC
OPIATES UR QL: NEGATIVE
P-R INTERVAL, ECG05: 130 MS
PCP UR QL: NEGATIVE
PH UR STRIP: 6.5 [PH] (ref 5–8)
PHENOBARB SERPL-MCNC: 24.9 UG/ML (ref 15–40)
PHENOBARB SERPL-MCNC: 27.8 UG/ML (ref 15–40)
PLATELET # BLD AUTO: 160 K/UL (ref 150–400)
PMV BLD AUTO: 12.1 FL (ref 8.9–12.9)
POTASSIUM SERPL-SCNC: 3.6 MMOL/L (ref 3.5–5.1)
PROT SERPL-MCNC: 6.8 G/DL (ref 6.4–8.2)
PROT UR STRIP-MCNC: NEGATIVE MG/DL
Q-T INTERVAL, ECG07: 430 MS
QRS DURATION, ECG06: 70 MS
QTC CALCULATION (BEZET), ECG08: 418 MS
RBC # BLD AUTO: 4.34 M/UL (ref 3.8–5.2)
RBC #/AREA URNS HPF: ABNORMAL /HPF (ref 0–5)
SALICYLATES SERPL-MCNC: <1.7 MG/DL (ref 2.8–20)
SODIUM SERPL-SCNC: 140 MMOL/L (ref 136–145)
SP GR UR REFRACTOMETRY: 1.02 (ref 1–1.03)
TSH SERPL DL<=0.05 MIU/L-ACNC: 1.16 UIU/ML (ref 0.36–3.74)
UR CULT HOLD, URHOLD: NORMAL
UROBILINOGEN UR QL STRIP.AUTO: 0.2 EU/DL (ref 0.2–1)
VENTRICULAR RATE, ECG03: 57 BPM
WBC # BLD AUTO: 5.4 K/UL (ref 3.6–11)
WBC URNS QL MICRO: ABNORMAL /HPF (ref 0–4)

## 2018-02-16 PROCEDURE — 74011250636 HC RX REV CODE- 250/636: Performed by: EMERGENCY MEDICINE

## 2018-02-16 PROCEDURE — 36415 COLL VENOUS BLD VENIPUNCTURE: CPT | Performed by: EMERGENCY MEDICINE

## 2018-02-16 PROCEDURE — 81001 URINALYSIS AUTO W/SCOPE: CPT | Performed by: PHYSICIAN ASSISTANT

## 2018-02-16 PROCEDURE — 81025 URINE PREGNANCY TEST: CPT

## 2018-02-16 PROCEDURE — 84443 ASSAY THYROID STIM HORMONE: CPT | Performed by: PSYCHIATRY & NEUROLOGY

## 2018-02-16 PROCEDURE — 80307 DRUG TEST PRSMV CHEM ANLYZR: CPT | Performed by: EMERGENCY MEDICINE

## 2018-02-16 PROCEDURE — 70450 CT HEAD/BRAIN W/O DYE: CPT

## 2018-02-16 PROCEDURE — 80184 ASSAY OF PHENOBARBITAL: CPT | Performed by: EMERGENCY MEDICINE

## 2018-02-16 PROCEDURE — 65220000003 HC RM SEMIPRIVATE PSYCH

## 2018-02-16 RX ORDER — ZOLPIDEM TARTRATE 10 MG/1
10 TABLET ORAL
Status: DISCONTINUED | OUTPATIENT
Start: 2018-02-16 | End: 2018-02-17 | Stop reason: HOSPADM

## 2018-02-16 RX ORDER — LORAZEPAM 1 MG/1
1 TABLET ORAL
Status: DISCONTINUED | OUTPATIENT
Start: 2018-02-16 | End: 2018-02-17 | Stop reason: HOSPADM

## 2018-02-16 RX ORDER — SERTRALINE HYDROCHLORIDE 25 MG/1
25 TABLET, FILM COATED ORAL DAILY
COMMUNITY

## 2018-02-16 RX ORDER — IBUPROFEN 200 MG
1 TABLET ORAL
Status: DISCONTINUED | OUTPATIENT
Start: 2018-02-16 | End: 2018-02-17 | Stop reason: HOSPADM

## 2018-02-16 RX ORDER — IBUPROFEN 400 MG/1
400 TABLET ORAL
Status: DISCONTINUED | OUTPATIENT
Start: 2018-02-16 | End: 2018-02-17 | Stop reason: HOSPADM

## 2018-02-16 RX ORDER — ACETAMINOPHEN 325 MG/1
650 TABLET ORAL
Status: DISCONTINUED | OUTPATIENT
Start: 2018-02-16 | End: 2018-02-17 | Stop reason: HOSPADM

## 2018-02-16 RX ORDER — DEXTROAMPHETAMINE SACCHARATE, AMPHETAMINE ASPARTATE, DEXTROAMPHETAMINE SULFATE AND AMPHETAMINE SULFATE 5; 5; 5; 5 MG/1; MG/1; MG/1; MG/1
20 TABLET ORAL 2 TIMES DAILY
COMMUNITY
End: 2018-02-16 | Stop reason: DRUGHIGH

## 2018-02-16 RX ORDER — DEXTROAMPHETAMINE SACCHARATE, AMPHETAMINE ASPARTATE MONOHYDRATE, DEXTROAMPHETAMINE SULFATE AND AMPHETAMINE SULFATE 5; 5; 5; 5 MG/1; MG/1; MG/1; MG/1
20 CAPSULE, EXTENDED RELEASE ORAL 2 TIMES DAILY
COMMUNITY
End: 2018-02-19

## 2018-02-16 RX ORDER — LORAZEPAM 2 MG/ML
2 INJECTION INTRAMUSCULAR
Status: DISCONTINUED | OUTPATIENT
Start: 2018-02-16 | End: 2018-02-17 | Stop reason: HOSPADM

## 2018-02-16 RX ORDER — ADHESIVE BANDAGE
30 BANDAGE TOPICAL DAILY PRN
Status: DISCONTINUED | OUTPATIENT
Start: 2018-02-16 | End: 2018-02-17 | Stop reason: HOSPADM

## 2018-02-16 RX ORDER — BISMUTH SUBSALICYLATE 262 MG
1 TABLET,CHEWABLE ORAL DAILY
COMMUNITY
End: 2018-02-19

## 2018-02-16 RX ORDER — BENZTROPINE MESYLATE 1 MG/ML
2 INJECTION INTRAMUSCULAR; INTRAVENOUS
Status: DISCONTINUED | OUTPATIENT
Start: 2018-02-16 | End: 2018-02-17 | Stop reason: HOSPADM

## 2018-02-16 RX ORDER — BENZTROPINE MESYLATE 2 MG/1
2 TABLET ORAL
Status: DISCONTINUED | OUTPATIENT
Start: 2018-02-16 | End: 2018-02-17 | Stop reason: HOSPADM

## 2018-02-16 RX ORDER — OLANZAPINE 5 MG/1
5 TABLET ORAL
Status: DISCONTINUED | OUTPATIENT
Start: 2018-02-16 | End: 2018-02-17 | Stop reason: HOSPADM

## 2018-02-16 RX ADMIN — SODIUM CHLORIDE 1000 ML: 900 INJECTION, SOLUTION INTRAVENOUS at 02:03

## 2018-02-16 NOTE — ED NOTES
Assumed care of patient. Very drowsy, but woke with gentle shake. Pt able to answer all orientation questions appropriately. VSS. Provided with additional warm blankets. Pt to have Phenobarb level redrawn at 0600. Mother at bedside. Aware of plan of care.

## 2018-02-16 NOTE — ROUTINE PROCESS
TRANSFER - OUT REPORT:    Verbal report given to Kimberly Vanessa RN(name) on [address]  being transferred to (unit) for routine progression of care       Report consisted of patients Situation, Background, Assessment and   Recommendations(SBAR). Information from the following report(s) SBAR, ED Summary, [facility] and Recent Results was reviewed with the receiving nurse. Lines:   Peripheral IV 02/15/18 Left Antecubital (Active)   Site Assessment Clean, dry, & intact 2/15/2018 11:51 PM   Phlebitis Assessment 0 2/15/2018 11:51 PM   Infiltration Assessment 0 2/15/2018 11:51 PM   Dressing Status Clean, dry, & intact 2/15/2018 11:51 PM   Dressing Type Transparent 2/15/2018 11:51 PM   Hub Color/Line Status Pink;Patent; Flushed 2/15/2018 11:51 PM   Action Taken Blood drawn 2/15/2018 11:51 PM   Alcohol Cap Used No 2/15/2018 11:51 PM        Opportunity for questions and clarification was provided.

## 2018-02-16 NOTE — ED NOTES
Patient drowsy, resting in ED stretcher. Mother is still at bedside. VSS, no acute distress. Bed low and locked.

## 2018-02-16 NOTE — BH NOTES
Received on unit. No voiced complaints or acute distress at present.   Appears in no acute distress at present  B/p slightly low, aware

## 2018-02-16 NOTE — PROGRESS NOTES
Admission Medication Reconciliation:    Information obtained from: Patient, Rx Query    Significant PMH/Disease States:   Past Medical History:   Diagnosis Date    Depression     Hashimoto's encephalopathy     Hypothyroid     Hypothyroid     Insomnia     Migraine     Shuffling gait 10/18/2016    --intermittent when head was shaking/bobbing     Vertigo        Chief Complaint for this Admission:  Phenobarbital ingestion, drowsiness    Allergies:  Review of patient's allergies indicates no known allergies. Prior to Admission Medications:   Prior to Admission Medications   Prescriptions Last Dose Informant Patient Reported? Taking? ASHWAGANDHA ROOT EXTRACT,BULK, 2/14/2018  Yes Yes   Sig: by Does Not Apply route daily. SYNTHROID 125 mcg tablet   Yes Yes   Sig: TAKE 1/2  TABLET BY MOUTH DAILY   amphetamine-dextroamphetamine XR (ADDERALL XR) 20 mg XR capsule 2/13/2018  Yes Yes   Sig: Take 20 mg by mouth two (2) times a day. fludrocortisone (FLORINEF) 0.1 mg tablet 2/13/2018  No Yes   Sig: Take 2 Tabs by mouth daily. multivitamin (ONE A DAY) tablet 2/13/2018  Yes Yes   Sig: Take 1 Tab by mouth daily. sertraline (ZOLOFT) 25 mg tablet 2/13/2018  Yes Yes   Sig: Take 25 mg by mouth daily. Facility-Administered Medications: None         Comments/Recommendations: Completed medication reconciliation interview with patient, with the following changes:    1) She stated she is taking an over-the-counter extract Ashwagandha root, and had recently restarted taking sertraline 25 mg.   2) She has stopped taking Northera (droxidopa), and has not taken any of her medications since Wednesday 2/13/18. 3) Also of note, she has a prescription for birth control at the pharmacy ([address] [place] [address]), but said she is not currently taking it. 4) She is no longer taking methylprednisolone.

## 2018-02-16 NOTE — ED NOTES
Pt continues to sleep soundly. Awaiting eval by BSMART. Encouraged pt to wake and prepare for possible discharge.   Pt is still very somnulent

## 2018-02-16 NOTE — ED NOTES
Spoke with poison control and updated them on patient's vitals, 0600 labs, and level of alertness. Also informed them of patient's planned admission. Poison control to close patient's case.

## 2018-02-16 NOTE — ED NOTES
Patient is currently resting in ED stretcher with mother at bedside. Patient easily roused to light verbal stimuli. Patient reports being hungry. VSS, no acute distress. Bed low and locked. Siderails up. Call bell within reach.

## 2018-02-16 NOTE — ED TRIAGE NOTES
Per pts friend the patient took Phenobarbital 24 hours ago, they are unaware if she is prescribed this medication, but patient states that she took it because she was having trouble sleeping. Pt states that she did not take anything else. Pt states that she feels tired, pt states that nothing hurts and that she has never taken phenobarbital before, pt states that it was her dogs medication. Pt states that she has been tired ever since she took the medication. Pt denies SI and HI.

## 2018-02-16 NOTE — ED NOTES
Spoke with poison control. Per poison control recommend symptomatic treatment and q6h phenobarbital levels to ensure trending downward.

## 2018-02-16 NOTE — ED NOTES
Assumed care of patient. Bedside report received from Hanh Horne RN. Patient is currently sleeping in ED stretcher; head of stretcher elevated to assist patient in waking up. Mother at bedside. VSS, no acute distress. Bed low and locked. Siderails up. Call bell within reach. Will continue to monitor.

## 2018-02-16 NOTE — BSMART NOTE
Comprehensive Assessment Form Part 1      Section I - Disposition    Axis I - Major Depressive D/O    Axis II - Deferred  Axis III -   Past Medical History:   Diagnosis Date    Depression     Hashimoto's encephalopathy     Hypothyroid     Hypothyroid     Insomnia     Migraine     Shuffling gait 10/18/2016    --intermittent when head was shaking/bobbing     Vertigo      Axis IV - Social, Env't  Axis V - 45-50      The Medical Doctor to Psychiatrist conference was not completed. The Medical Doctor is in agreement with Psychiatrist disposition because of (reason) patient warrants inpatient psych care for stabilization and monitoring. The plan is admit. The on-call Psychiatrist consulted was Dr. Cullen Medina. The admitting Psychiatrist will be Dr. Alex Ramirez. The admitting Diagnosis is Major Depression  Payor Source is [facility]. Section II - Integrated Summary  Summary:  Patient presents to ER following overdose on Phenobarbital. States that she took 2 of her dog's pills on Wed night in order to get to sleep. However, she was out of work yesterday and not responding to calls, so friends/family went to her home and found her lethargic. Although she states she only took 2, evidence suggests that she has taken more. Denies this was in attempt to end her life and denying any SI. During assessment, patient shares that she broke up with her boyfriend over 1 month ago \"but really broke up in the past 2wks\". Says that she's been feeling more depressed/sad since this time. In addition, she had stopped her Zoloft 2-3months ago feeling that she didn't want to be on it anymore, but 1 wk ago restarted it. Previously overdosed approx 10yrs ago after having stopped SSRI and restarting it for 1-2wks. Patient further reports that she's been struggling with insomnia and thoughts dog's medicine would help. Denies having taken it before.  Her anxiety has been higher as well and says that she's unsure if this is mood vs medication related(is on other Rx for Ortho Hypotension and Hashimoto's Disease). Patient has been working FT at [employer] and lives alone in [place]. Her parents live nearby and she talks to them regularly(mom is present in ER). Shared that she has good support from friends. Recently saw a new therapist [id number]. When asked about other symptoms she reports having poor energy and not feeling motivated. She acknowledges recreational MJ use several times per month. She denies any other drug use. Mom expressed some concerns that her daughter David Anderson always been a little bit of a hypochondriac I think and she's been working on eating healthier to not be on as much medicine, but we didn't think she should stop her Zoloft\". The patienthas demonstrated mental capacity to provide informed consent. The information is given by the patient and past medical records. The Chief Complaint is \"I took my dog's medicine\". The Precipitant Factors are recent break-up. Previous Hospitalizations: [facility] x10 yrs ago  The patient has not previously been in restraints. Current Psychiatrist and/or  [facility].    Lethality Assessment:    The potential for suicide noted by the following: previous history of attempts which occured on (date)10yrs ago in the form(s) of overdose. The potential for homicide is not noted. The patient has not been a perpetrator of sexual or physical abuse. There are not pending charges. The patient is felt to be at risk for self harm or harm to others. The attending nurse was advised . Section III - Psychosocial  The patient's overall mood and attitude is lethargic but cooperative. Feelings of helplessness and hopelessness are not observed. Generalized anxiety is observed by patient report. Panic is not observed. Phobias are not observed. Obsessive compulsive tendencies are not observed.       Section IV - Mental Status Exam  The patient's appearance shows no evidence of impairment. The patient's behavior show psychomotor retardation due to sedation The patient is oriented to time, place, person and situation. The patient's speech shows no evidence of impairment. The patient's mood is depressed. The range of affect shows no evidence of impairment. The patient's thought content demonstrates no evidence of impairment. The thought process shows no evidence of impairment. The patient's perception shows no evidence of impairment. The patient's memory shows no evidence of impairment. The patient's appetite is decreased and shows signs of weight loss. The patient's sleep has evidence of insomnia. The patient's insight shows no evidence of impairment. The patient's judgement is psychologically impaired. Section V - Substance Abuse  The patient is using substances. The patient is using cannabis by inhalation for 1-5 years with last use on 1wk ago. The patient has experienced the following withdrawal symptoms: not noted. Section VI - Living Arrangements  The patient is single. The patient lives alone. The patient has no children. The patient does plan to return home upon discharge. The patient does not have legal issues pending. The patient's source of income comes from employment. [employer] and cultural practices have not been voiced at this time. The patient's greatest support comes from friends and this person will be involved with the treatment. The patient has not been in an event described as horrible or outside the realm of ordinary life experience either currently or in the past.  It is unknown if the patient has been a victim of sexual/physical abuse. Section VII - Other Areas of Clinical Concern  The highest grade achieved is [employer] with the overall quality of school experience being described as good. The patient is currently employed and speaks [place] as a primary language.   The patient has no communication impairments affecting communication. The patient's preference for learning can be described as: can read and write adequately.   The patient's hearing is normal.  The patient's vision is normal.      Kirsten Garza, [facility]

## 2018-02-16 NOTE — PROGRESS NOTES
02/16/18 1357   Vital Signs   Pulse (Heart Rate) 76   Resp Rate 12   O2 Sat (%) 97 %   Level of Consciousness Alert   BP (!) 84/59   MAP (Calculated) 67   BP 1 Method Automatic   oral fluids given. 1200 mL water intake. Will reassess VS.     Unsteady gait. Pt alert and oriented.      Primary Nurse Daphney Ferreira and Lily Caldwell RN performed a dual skin assessment on this patient No impairment noted  Wil score is 23

## 2018-02-16 NOTE — ED PROVIDER NOTES
HPI Comments: 29 y.o. female with past medical history significant for depression, hypothyroidism, insomnia, hashimoto's encephalopathy who presents from home for evaluation of a medication reaction. Pt took an her \"dog's medication\" of phenobarbital 24 hours ago. Per patient, she took two tablets of phenobarbital, but per parents she took roughly 10 tablets of phenobarbital. Pt c/o of feeling very tired at this time, but she denies any other complaints. Pt used to be in/out of the hospital two years ago for neuro sx. Was eventually diagnosed with Hashimoto's encephalopathy three months ago, and will be seeing Dr. Geena Min (Neurologist) at [facility]. Per parents, pt has a h/o depression with suicidal attempt 11 years ago, and was admitted to [facility] at that time. She was recently started back on SSRI. Pt notes that she took her regular medications in addition to the phenobarbital, but denies any unprescribed or illicit subsatnces. Denies HA, abd pain, CP, n/v, diarrhea, constipation, SI, and depression. There are no other acute medical concerns at this time. PCP: Nghia Ricks DO    Full history, physical exam, and ROS unable to be obtained due to:  Patient condition. Note written by Joseline Harris, as dictated by Maryanne Castro MD 11:35 PM      The history is provided by the patient and a parent. The history is limited by the condition of the patient. No  was used. Past Medical History:   Diagnosis Date    Depression     Hashimoto's encephalopathy     Hypothyroid     Hypothyroid     Insomnia     Migraine     Shuffling gait 10/18/2016    --intermittent when head was shaking/bobbing     Vertigo        History reviewed. No pertinent surgical history.       Family History:   Problem Relation Age of Onset    Heart Disease Maternal Grandfather     Cancer Paternal Grandmother     Heart Disease Paternal Grandmother        Social History     Social History    Marital status: SINGLE     Spouse name: N/A    Number of children: N/A    Years of education: N/A     Occupational History    Not on file. Social History Main Topics    Smoking status: Never Smoker    Smokeless tobacco: Never Used    Alcohol use Yes      Comment: rarely    Drug use: No    Sexual activity: Not on file     Other Topics Concern    Not on file     Social History Narrative         ALLERGIES: Review of patient's allergies indicates no known allergies. Review of Systems   Unable to perform ROS: Other (patient lethargic)       Vitals:    02/15/18 2316   BP: 91/62   Pulse: 76   Resp: 16   Temp: 98.6 °F (37 °C)   SpO2: 96%   Weight: 65.3 kg (144 lb)   Height: 5' 7\" (1.702 m)            Physical Exam   Nursing note and vitals reviewed. CONSTITUTIONAL: Well-appearing; well-nourished; in no apparent distress;   HEAD: Normocephalic; atraumatic  EYES: PERRL; EOM intact; conjunctiva and sclera are clear bilaterally. ENT: No rhinorrhea; normal pharynx with no tonsillar hypertrophy; mucous membranes pink/moist, no erythema, no exudate. NECK: Supple; non-tender; no cervical lymphadenopathy  CARD: Normal S1, S2; no murmurs, rubs, or gallops. Regular rate and rhythm. RESP: Normal respiratory effort; breath sounds clear and equal bilaterally; no wheezes, rhonchi, or rales. ABD: Normal bowel sounds; non-distended; non-tender; no palpable organomegaly, no masses, no bruits. Back Exam: Normal inspection; no vertebral point tenderness, no CVA tenderness. Normal range of motion. EXT: Normal ROM in all four extremities; non-tender to palpation; no swelling or deformity; distal pulses are normal, no edema. SKIN: Warm; dry; no rash.   NEURO: lethargic but arousable and oriented x 3, coherent, JO-ANN-XII grossly intact, sensory and motor are non-focal.  Psych exam: Flat affect and depressed mood; patient denies any suicidal or homicidal ideation          MDM  Number of Diagnoses or Management Options  Diagnosis management comments: Assessment: intentional versus incidental overdose of phenobarbital/ depression/ rule out electrolyte abnormality/ ACS/ suicide attempt. Plan: EKG/ lab/ IV fluid/ consult BSMRT/ serial exam/ Monitor and Reevaluate. Amount and/or Complexity of Data Reviewed  Clinical lab tests: ordered and reviewed  Tests in the radiology section of CPT®: ordered and reviewed  Tests in the medicine section of CPT®: reviewed and ordered  Discussion of test results with the performing providers: yes  Decide to obtain previous medical records or to obtain history from someone other than the patient: yes  Obtain history from someone other than the patient: yes  Review and summarize past medical records: yes  Discuss the patient with other providers: yes  Independent visualization of images, tracings, or specimens: yes    Risk of Complications, Morbidity, and/or Mortality  Presenting problems: moderate  Diagnostic procedures: moderate  Management options: moderate    Critical Care  Total time providing critical care: (Total critical care time spent exclusive of procedures: 60 minutes)    Patient Progress  Patient progress: stable        ED Course       Procedures     ED EKG interpretation:  Rhythm: sinus bradycardia; and regular . Rate (approx.): 57; Axis: normal; P wave: normal; QRS interval: normal ; ST/T wave: non-specific changes; in  Lead: Diffusely; Other findings: abnormal ekg. This EKG was interpreted by Raisa Juares MD,ED Provider. PROGRESS NOTE:  Pt has been reexamined by Raisa Juares MD all available results have been reviewed with pt and any available family. She is resting in bed comfortably, and in no apparent distress. Family at bedside, has no further question. Will continue to monitor and repeat phenobarbital level in 2 hours as recommended by poison control.   Written by Raisa Juares MD,  04:42 AM    PROGRESS NOTE:  Pt has been reexamined by Raisa Juares MD all available results have been reviewed with pt and any available family. Repeat phenobarbital level is trending down. Patient remains hemodynamically stable. Will consult BSMRT for psychiatric evaluation and further treatment as deemed appropriate. Written by Douglas Last MD,  6:50 AM    PROGRESS NOTE:  Pt has been reexamined by Douglas Last MD all available results have been reviewed with pt and any available family. The patient was medically cleared by me. The patient was seen and evaluated by [facility], who after discussion with the on-call psychiatrist, recommends admission to the hospital. Pt understands sx, dx, and tx in ED. Care plan has been outlined and questions have been answered. Pt and any available family understands and agrees to need for admission to hospital for further tx not available in ED. Pt is ready for admission. Written by Douglas Last MD,  9:00 AM    .     . Lorraine Jessica

## 2018-02-17 ENCOUNTER — HOSPITAL ENCOUNTER (OUTPATIENT)
Age: 33
Setting detail: OBSERVATION
Discharge: PSYCHIATRIC OR SUBSTANCE USE FACILITY | DRG: 315 | End: 2018-02-17
Attending: INTERNAL MEDICINE | Admitting: INTERNAL MEDICINE
Payer: COMMERCIAL

## 2018-02-17 ENCOUNTER — HOSPITAL ENCOUNTER (INPATIENT)
Age: 33
LOS: 2 days | Discharge: HOME | DRG: 885 | End: 2018-02-19
Attending: PSYCHIATRY & NEUROLOGY | Admitting: PSYCHIATRY & NEUROLOGY
Payer: COMMERCIAL

## 2018-02-17 VITALS
OXYGEN SATURATION: 99 % | HEART RATE: 84 BPM | DIASTOLIC BLOOD PRESSURE: 97 MMHG | SYSTOLIC BLOOD PRESSURE: 142 MMHG | RESPIRATION RATE: 19 BRPM | TEMPERATURE: 98.3 F

## 2018-02-17 PROBLEM — F32.A DEPRESSIVE DISORDER: Status: ACTIVE | Noted: 2018-02-17

## 2018-02-17 LAB
ALBUMIN SERPL-MCNC: 2.9 G/DL (ref 3.5–5)
ALBUMIN/GLOB SERPL: 1 {RATIO} (ref 1.1–2.2)
ALP SERPL-CCNC: 31 U/L (ref 45–117)
ALT SERPL-CCNC: 17 U/L (ref 12–78)
ANION GAP SERPL CALC-SCNC: 6 MMOL/L (ref 5–15)
AST SERPL-CCNC: 11 U/L (ref 15–37)
BASOPHILS # BLD: 0 K/UL (ref 0–0.1)
BASOPHILS NFR BLD: 0 % (ref 0–1)
BILIRUB SERPL-MCNC: 0.4 MG/DL (ref 0.2–1)
BUN SERPL-MCNC: 12 MG/DL (ref 6–20)
BUN/CREAT SERPL: 17 (ref 12–20)
CALCIUM SERPL-MCNC: 8 MG/DL (ref 8.5–10.1)
CHLORIDE SERPL-SCNC: 106 MMOL/L (ref 97–108)
CHOLEST SERPL-MCNC: 153 MG/DL
CO2 SERPL-SCNC: 29 MMOL/L (ref 21–32)
CREAT SERPL-MCNC: 0.69 MG/DL (ref 0.55–1.02)
DIFFERENTIAL METHOD BLD: ABNORMAL
EOSINOPHIL # BLD: 0.2 K/UL (ref 0–0.4)
EOSINOPHIL NFR BLD: 4 % (ref 0–7)
ERYTHROCYTE [DISTWIDTH] IN BLOOD BY AUTOMATED COUNT: 13.4 % (ref 11.5–14.5)
GLOBULIN SER CALC-MCNC: 3 G/DL (ref 2–4)
GLUCOSE SERPL-MCNC: 124 MG/DL (ref 65–100)
HCT VFR BLD AUTO: 36.5 % (ref 35–47)
HDLC SERPL-MCNC: 62 MG/DL
HDLC SERPL: 2.5 {RATIO} (ref 0–5)
HGB BLD-MCNC: 12.4 G/DL (ref 11.5–16)
IMM GRANULOCYTES # BLD: 0 K/UL (ref 0–0.04)
IMM GRANULOCYTES NFR BLD AUTO: 0 % (ref 0–0.5)
LDLC SERPL CALC-MCNC: 67.2 MG/DL (ref 0–100)
LIPID PROFILE,FLP: NORMAL
LYMPHOCYTES # BLD: 2 K/UL (ref 0.8–3.5)
LYMPHOCYTES NFR BLD: 41 % (ref 12–49)
MAGNESIUM SERPL-MCNC: 2.1 MG/DL (ref 1.6–2.4)
MCH RBC QN AUTO: 30.1 PG (ref 26–34)
MCHC RBC AUTO-ENTMCNC: 34 G/DL (ref 30–36.5)
MCV RBC AUTO: 88.6 FL (ref 80–99)
MONOCYTES # BLD: 0.4 K/UL (ref 0–1)
MONOCYTES NFR BLD: 9 % (ref 5–13)
NEUTS SEG # BLD: 2.2 K/UL (ref 1.8–8)
NEUTS SEG NFR BLD: 46 % (ref 32–75)
NRBC # BLD: 0 K/UL (ref 0–0.01)
NRBC BLD-RTO: 0 PER 100 WBC
PHOSPHATE SERPL-MCNC: 4 MG/DL (ref 2.6–4.7)
PLATELET # BLD AUTO: 146 K/UL (ref 150–400)
PMV BLD AUTO: 11.9 FL (ref 8.9–12.9)
POTASSIUM SERPL-SCNC: 3.7 MMOL/L (ref 3.5–5.1)
PROT SERPL-MCNC: 5.9 G/DL (ref 6.4–8.2)
RBC # BLD AUTO: 4.12 M/UL (ref 3.8–5.2)
SODIUM SERPL-SCNC: 141 MMOL/L (ref 136–145)
TRIGL SERPL-MCNC: 119 MG/DL (ref ?–150)
VLDLC SERPL CALC-MCNC: 23.8 MG/DL
WBC # BLD AUTO: 4.8 K/UL (ref 3.6–11)

## 2018-02-17 PROCEDURE — G0379 DIRECT REFER HOSPITAL OBSERV: HCPCS

## 2018-02-17 PROCEDURE — 65660000000 HC RM CCU STEPDOWN

## 2018-02-17 PROCEDURE — 74011250637 HC RX REV CODE- 250/637: Performed by: INTERNAL MEDICINE

## 2018-02-17 PROCEDURE — 99218 HC RM OBSERVATION: CPT

## 2018-02-17 PROCEDURE — 65220000003 HC RM SEMIPRIVATE PSYCH

## 2018-02-17 PROCEDURE — 74011250636 HC RX REV CODE- 250/636: Performed by: INTERNAL MEDICINE

## 2018-02-17 PROCEDURE — 80061 LIPID PANEL: CPT | Performed by: INTERNAL MEDICINE

## 2018-02-17 PROCEDURE — 96372 THER/PROPH/DIAG INJ SC/IM: CPT

## 2018-02-17 PROCEDURE — 84100 ASSAY OF PHOSPHORUS: CPT | Performed by: INTERNAL MEDICINE

## 2018-02-17 PROCEDURE — 85025 COMPLETE CBC W/AUTO DIFF WBC: CPT | Performed by: INTERNAL MEDICINE

## 2018-02-17 PROCEDURE — G0378 HOSPITAL OBSERVATION PER HR: HCPCS

## 2018-02-17 PROCEDURE — 36415 COLL VENOUS BLD VENIPUNCTURE: CPT | Performed by: INTERNAL MEDICINE

## 2018-02-17 PROCEDURE — 80053 COMPREHEN METABOLIC PANEL: CPT | Performed by: INTERNAL MEDICINE

## 2018-02-17 PROCEDURE — 74011250637 HC RX REV CODE- 250/637: Performed by: PSYCHIATRY & NEUROLOGY

## 2018-02-17 PROCEDURE — 83735 ASSAY OF MAGNESIUM: CPT | Performed by: INTERNAL MEDICINE

## 2018-02-17 PROCEDURE — 96360 HYDRATION IV INFUSION INIT: CPT

## 2018-02-17 RX ORDER — ZOLPIDEM TARTRATE 10 MG/1
10 TABLET ORAL
Status: DISCONTINUED | OUTPATIENT
Start: 2018-02-17 | End: 2018-02-17 | Stop reason: DRUGHIGH

## 2018-02-17 RX ORDER — SERTRALINE HYDROCHLORIDE 50 MG/1
25 TABLET, FILM COATED ORAL DAILY
Status: DISCONTINUED | OUTPATIENT
Start: 2018-02-17 | End: 2018-02-17 | Stop reason: HOSPADM

## 2018-02-17 RX ORDER — SERTRALINE HYDROCHLORIDE 50 MG/1
25 TABLET, FILM COATED ORAL DAILY
Status: CANCELLED | OUTPATIENT
Start: 2018-02-18

## 2018-02-17 RX ORDER — ACETAMINOPHEN 325 MG/1
650 TABLET ORAL
Status: DISCONTINUED | OUTPATIENT
Start: 2018-02-17 | End: 2018-02-19 | Stop reason: HOSPADM

## 2018-02-17 RX ORDER — BENZTROPINE MESYLATE 1 MG/ML
2 INJECTION INTRAMUSCULAR; INTRAVENOUS
Status: DISCONTINUED | OUTPATIENT
Start: 2018-02-17 | End: 2018-02-19 | Stop reason: HOSPADM

## 2018-02-17 RX ORDER — ENOXAPARIN SODIUM 100 MG/ML
40 INJECTION SUBCUTANEOUS EVERY 24 HOURS
Status: DISCONTINUED | OUTPATIENT
Start: 2018-02-17 | End: 2018-02-17 | Stop reason: HOSPADM

## 2018-02-17 RX ORDER — DEXTROAMPHETAMINE SACCHARATE, AMPHETAMINE ASPARTATE MONOHYDRATE, DEXTROAMPHETAMINE SULFATE AND AMPHETAMINE SULFATE 1.25; 1.25; 1.25; 1.25 MG/1; MG/1; MG/1; MG/1
20 CAPSULE, EXTENDED RELEASE ORAL 2 TIMES DAILY
Status: CANCELLED | OUTPATIENT
Start: 2018-02-17

## 2018-02-17 RX ORDER — LEVOTHYROXINE SODIUM 125 UG/1
62.5 TABLET ORAL
Status: DISCONTINUED | OUTPATIENT
Start: 2018-02-17 | End: 2018-02-17 | Stop reason: HOSPADM

## 2018-02-17 RX ORDER — DEXTROAMPHETAMINE SACCHARATE, AMPHETAMINE ASPARTATE MONOHYDRATE, DEXTROAMPHETAMINE SULFATE AND AMPHETAMINE SULFATE 1.25; 1.25; 1.25; 1.25 MG/1; MG/1; MG/1; MG/1
20 CAPSULE, EXTENDED RELEASE ORAL 2 TIMES DAILY
Status: DISCONTINUED | OUTPATIENT
Start: 2018-02-17 | End: 2018-02-17

## 2018-02-17 RX ORDER — ONDANSETRON 2 MG/ML
4 INJECTION INTRAMUSCULAR; INTRAVENOUS
Status: DISCONTINUED | OUTPATIENT
Start: 2018-02-17 | End: 2018-02-17 | Stop reason: HOSPADM

## 2018-02-17 RX ORDER — OLANZAPINE 5 MG/1
5 TABLET ORAL
Status: DISCONTINUED | OUTPATIENT
Start: 2018-02-17 | End: 2018-02-19 | Stop reason: HOSPADM

## 2018-02-17 RX ORDER — SODIUM CHLORIDE 0.9 % (FLUSH) 0.9 %
5-10 SYRINGE (ML) INJECTION EVERY 8 HOURS
Status: DISCONTINUED | OUTPATIENT
Start: 2018-02-17 | End: 2018-02-17 | Stop reason: HOSPADM

## 2018-02-17 RX ORDER — DOCUSATE SODIUM 100 MG/1
100 CAPSULE, LIQUID FILLED ORAL 2 TIMES DAILY
Status: CANCELLED | OUTPATIENT
Start: 2018-02-17

## 2018-02-17 RX ORDER — DEXTROAMPHETAMINE SACCHARATE, AMPHETAMINE ASPARTATE MONOHYDRATE, DEXTROAMPHETAMINE SULFATE AND AMPHETAMINE SULFATE 1.25; 1.25; 1.25; 1.25 MG/1; MG/1; MG/1; MG/1
20 CAPSULE, EXTENDED RELEASE ORAL 2 TIMES DAILY
Status: DISCONTINUED | OUTPATIENT
Start: 2018-02-17 | End: 2018-02-17 | Stop reason: HOSPADM

## 2018-02-17 RX ORDER — DOCUSATE SODIUM 100 MG/1
100 CAPSULE, LIQUID FILLED ORAL 2 TIMES DAILY
Status: DISCONTINUED | OUTPATIENT
Start: 2018-02-17 | End: 2018-02-19 | Stop reason: HOSPADM

## 2018-02-17 RX ORDER — FLUDROCORTISONE ACETATE 0.1 MG/1
0.2 TABLET ORAL DAILY
Status: DISCONTINUED | OUTPATIENT
Start: 2018-02-17 | End: 2018-02-17 | Stop reason: HOSPADM

## 2018-02-17 RX ORDER — SERTRALINE HYDROCHLORIDE 50 MG/1
25 TABLET, FILM COATED ORAL DAILY
Status: DISCONTINUED | OUTPATIENT
Start: 2018-02-18 | End: 2018-02-19 | Stop reason: HOSPADM

## 2018-02-17 RX ORDER — ZOLPIDEM TARTRATE 5 MG/1
5 TABLET ORAL
Status: DISCONTINUED | OUTPATIENT
Start: 2018-02-17 | End: 2018-02-18

## 2018-02-17 RX ORDER — ACETAMINOPHEN 325 MG/1
650 TABLET ORAL
Status: DISCONTINUED | OUTPATIENT
Start: 2018-02-17 | End: 2018-02-17 | Stop reason: HOSPADM

## 2018-02-17 RX ORDER — LORAZEPAM 2 MG/ML
2 INJECTION INTRAMUSCULAR
Status: DISCONTINUED | OUTPATIENT
Start: 2018-02-17 | End: 2018-02-19 | Stop reason: HOSPADM

## 2018-02-17 RX ORDER — FLUDROCORTISONE ACETATE 0.1 MG/1
0.2 TABLET ORAL DAILY
Status: DISCONTINUED | OUTPATIENT
Start: 2018-02-18 | End: 2018-02-19 | Stop reason: HOSPADM

## 2018-02-17 RX ORDER — BENZTROPINE MESYLATE 2 MG/1
2 TABLET ORAL
Status: DISCONTINUED | OUTPATIENT
Start: 2018-02-17 | End: 2018-02-19 | Stop reason: HOSPADM

## 2018-02-17 RX ORDER — DOCUSATE SODIUM 100 MG/1
100 CAPSULE, LIQUID FILLED ORAL 2 TIMES DAILY
Status: DISCONTINUED | OUTPATIENT
Start: 2018-02-17 | End: 2018-02-17 | Stop reason: HOSPADM

## 2018-02-17 RX ORDER — SODIUM CHLORIDE 0.9 % (FLUSH) 0.9 %
5-10 SYRINGE (ML) INJECTION AS NEEDED
Status: DISCONTINUED | OUTPATIENT
Start: 2018-02-17 | End: 2018-02-17 | Stop reason: HOSPADM

## 2018-02-17 RX ORDER — ACETAMINOPHEN 325 MG/1
650 TABLET ORAL
Status: CANCELLED | OUTPATIENT
Start: 2018-02-17

## 2018-02-17 RX ORDER — LEVOTHYROXINE SODIUM 125 UG/1
62.5 TABLET ORAL
Status: CANCELLED | OUTPATIENT
Start: 2018-02-18

## 2018-02-17 RX ORDER — FLUDROCORTISONE ACETATE 0.1 MG/1
0.2 TABLET ORAL DAILY
Status: CANCELLED | OUTPATIENT
Start: 2018-02-18

## 2018-02-17 RX ORDER — LORAZEPAM 1 MG/1
1 TABLET ORAL
Status: DISCONTINUED | OUTPATIENT
Start: 2018-02-17 | End: 2018-02-19 | Stop reason: HOSPADM

## 2018-02-17 RX ORDER — SODIUM CHLORIDE 9 MG/ML
125 INJECTION, SOLUTION INTRAVENOUS CONTINUOUS
Status: DISCONTINUED | OUTPATIENT
Start: 2018-02-17 | End: 2018-02-17

## 2018-02-17 RX ORDER — ADHESIVE BANDAGE
30 BANDAGE TOPICAL DAILY PRN
Status: DISCONTINUED | OUTPATIENT
Start: 2018-02-17 | End: 2018-02-19 | Stop reason: HOSPADM

## 2018-02-17 RX ORDER — IBUPROFEN 400 MG/1
400 TABLET ORAL
Status: DISCONTINUED | OUTPATIENT
Start: 2018-02-17 | End: 2018-02-19 | Stop reason: HOSPADM

## 2018-02-17 RX ORDER — IBUPROFEN 200 MG
1 TABLET ORAL
Status: DISCONTINUED | OUTPATIENT
Start: 2018-02-17 | End: 2018-02-19 | Stop reason: HOSPADM

## 2018-02-17 RX ADMIN — Medication 10 ML: at 06:00

## 2018-02-17 RX ADMIN — ZOLPIDEM TARTRATE 5 MG: 5 TABLET ORAL at 23:18

## 2018-02-17 RX ADMIN — DEXTROAMPHETAMINE SACCHARATE, AMPHETAMINE ASPARTATE MONOHYDRATE, DEXTROAMPHETAMINE SULFATE, AND AMPHETAMINE SULFATE 20 MG: 1.25; 1.25; 1.25; 1.25 CAPSULE, EXTENDED RELEASE ORAL at 09:31

## 2018-02-17 RX ADMIN — DOCUSATE SODIUM 100 MG: 100 CAPSULE, LIQUID FILLED ORAL at 17:39

## 2018-02-17 RX ADMIN — DOCUSATE SODIUM 100 MG: 100 CAPSULE, LIQUID FILLED ORAL at 09:32

## 2018-02-17 RX ADMIN — SERTRALINE HYDROCHLORIDE 25 MG: 50 TABLET ORAL at 09:31

## 2018-02-17 RX ADMIN — FLUDROCORTISONE ACETATE 200 MCG: 0.1 TABLET ORAL at 09:32

## 2018-02-17 RX ADMIN — ENOXAPARIN SODIUM 40 MG: 40 INJECTION SUBCUTANEOUS at 09:32

## 2018-02-17 RX ADMIN — LEVOTHYROXINE SODIUM 62.5 MCG: 125 TABLET ORAL at 06:00

## 2018-02-17 RX ADMIN — SODIUM CHLORIDE 125 ML/HR: 900 INJECTION, SOLUTION INTRAVENOUS at 03:00

## 2018-02-17 NOTE — BH NOTES
1545: Patient received back  on unit from [facility].   Patient medically cleared. Patient is alert and oriented x4. Ambulatory with no assist.   Patient denies SI and HI and is able to contract for safety while on unit. Belongings have been secured. Skin assessment completed by Chang Ying and Jemma Espana   Skin is warm, dry and intact. Multiple healed superficial scratches on bilateral arms. 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 Patient is adjusting well    2100: Patient alert, out on unit. Attended reflections group. Will continue to monitor on Q 15 minute safety checks.

## 2018-02-17 NOTE — PROGRESS NOTES
I was tod that a hospitalist consult was called earlier, told the RN that I did not receive any notification for the same.  Will provide consult as we have been notified now

## 2018-02-17 NOTE — BH NOTES
TRANSFER - OUT REPORT:    Verbal report given to [facility] 32  being transferred to [place] [id number] room 534 for urgent transfer       Report consisted of patients Situation, Background, Assessment and   Recommendations(SBAR). Information from the following report(s) SBAR was reviewed with the receiving nurse. Opportunity for questions and clarification was provided.       Patient transported with:   Registered Nurse

## 2018-02-17 NOTE — ROUTINE PROCESS
Primary Nurse II Diamond Sosa and Donita Cota, RN performed a dual skin assessment on this patient No impairment noted  Wil score is 23

## 2018-02-17 NOTE — BH NOTES
Spoke to Dr. Clinton Street, he recommends pt be transferred to remote tele due to low bp. Supervisor being paged for bed assignment. 0121-spoke to nursing supervisor, pt to go to room 534 under the service of Dr. Clinton Street.

## 2018-02-17 NOTE — BH NOTES
TRANSFER - IN REPORT:    Verbal report received Jean Georges(name) on [address]  being received from medical floor(unit) for routine progression of care      Report consisted of patients Situation, Background, Assessment and   Recommendations(SBAR). Information from the following report(s) ED Summary was reviewed with the receiving nurse. Opportunity for questions and clarification was provided. Assessment completed upon patients arrival to unit and care assumed.

## 2018-02-17 NOTE — IP AVS SNAPSHOT
2700 [facility] [address] 
[phone] Patient: Marvin Stout MRN: [id number] :1985 About your hospitalization You were admitted on:  2018 You last received care in the:  [address] You were discharged on:  2018 Why you were hospitalized Your primary diagnosis was:  Not on File Your diagnoses also included:  Depressive Disorder Follow-up Information Follow up With Details Comments Contact Info Nataliya Catalan, DO   [address] [place] [address] [place] [address] 
[phone] Nash Higuera On 2018 You have a 9:00am appointment for therapy. [place] [address] [facility]. [address] [place] [address] 
[phone] Brandon Palomo Schedule an appointment as soon as possible for a visit Please call to schedule an appointment to see the psychiatric nurse practitioner within 30 days for medication management. [place] [id number] [place] 
Bethanie [phone] 
[phone] Discharge Orders None A check sukhdeep indicates which time of day the medication should be taken. My Medications CONTINUE taking these medications Instructions Each Dose to Equal  
 Morning Noon Evening Bedtime  
 fludrocortisone 0.1 mg tablet Commonly known as:  FLORINEF Your last dose was: Your next dose is: Take 2 Tabs by mouth daily. 0.2 mg  
    
   
   
   
  
 SYNTHROID 125 mcg tablet Generic drug:  levothyroxine Your last dose was: Your next dose is: TAKE 1/2  TABLET BY MOUTH DAILY ZOLOFT 25 mg tablet Generic drug:  sertraline Your last dose was: Your next dose is: Take 25 mg by mouth daily. 25 mg  
    
   
   
   
  
  
STOP taking these medications ADDERALL XR 20 mg XR capsule Generic drug:  amphetamine-dextroamphetamine XR  
   
  
 ASHWAGANDHA ROOT EXTRACT(BULK)  
   
  
 multivitamin tablet Commonly known as:  ONE A DAY Discharge Instructions DISCHARGE SUMMARY 
 
NAME:Stefany Patel : 1985 MRN: [id number] The patient Yvonne Turner exhibits the ability to control behavior in a less restrictive environment. Patient's level of functioning is improving. No assaultive/destructive behavior has been observed for the past 24 hours. No suicidal/homicidal threat or behavior has been observed for the past 24 hours. There is no evidence of serious medication side effects. Patient has not been in physical or protective restraints for at least the past 24 hours. If weapons involved, how are they secured? No weapons involved. Is patient aware of and in agreement with discharge plan? Yes Arrangements for medication:  No prescriptions written. Referral for substance abuse treatment? Yes, Dr. Rin Pierre Referral for smoking cessation needed? Patient is not a smoker/Not applicable. Copy of discharge instructions to provider?:  Dr. Rin Pierre ([phone]); Terry Abebe ([phone]) Arrangements for transportation home:  Family to . Keep all follow up appointments as scheduled, continue to take prescribed medications per physician instructions. Mental health crisis number:  136 or your local mental health crisis line number at [phone]. Embotics Announcement We are excited to announce that we are making your provider's discharge notes available to you in Embotics. You will see these notes when they are completed and signed by the physician that discharged you from your recent hospital stay.   If you have any questions or concerns about any information you see in Embotics, please call the Health Information Department where you were seen or reach out to your Primary Care Provider for more information about your plan of care. Introducing [facility] & HEALTH SERVICES! Dear Rhina Harada: Thank you for requesting a [email or web address] account. Our records indicate that you already have an active [email or web address] account. You can access your account anytime at [email or web address] Did you know that you can access your hospital and ER discharge instructions at any time in [email or web address]? You can also review all of your test results from your hospital stay or ER visit. Additional Information If you have questions, please visit the Frequently Asked Questions section of the [email or web address] website at [email or web address] Remember, [email or web address] is NOT to be used for urgent needs. For medical emergencies, dial 911. Now available from your iPhone and Android! Providers Seen During Your Hospitalization Provider Specialty Primary office phone Ailyn Oconnell MD Psychiatry [phone] Your Primary Care Physician (PCP) Primary Care Physician Office Phone Office Fax Perico Marks [phone] You are allergic to the following No active allergies Recent Documentation Breastfeeding? OB Status Smoking Status No Having regular periods Never Smoker Emergency Contacts Name Discharge Info Relation Home Work Mobile Octavia Benavides CAREGIVER [3] Mother [14] [phone] Kim Sherman CAREGIVER [3] Friend [5] [phone] Patient Belongings The following personal items are in your possession at time of discharge: 
  Dental Appliances: None  Visual Aid: None      Home Medications: None   Jewelry: None  Clothing: Footwear, Pants, Shirt, Socks, With patient (one pair shoes, 2shirts,withpt,3pairsocks)    Other Valuables: None  Personal Items Sent to Safe:  (none) Please provide this summary of care documentation to your next provider. Signatures-by signing, you are acknowledging that this After Visit Summary has been reviewed with you and you have received a copy. Patient Signature:  ____________________________________________________________ Date:  ____________________________________________________________  
  
Sharlyne Angel Provider Signature:  ____________________________________________________________ Date:  ____________________________________________________________

## 2018-02-17 NOTE — PROGRESS NOTES
Problem: Depressed Mood (Adult/Pediatric)  Goal: *STG: Demonstrates reduction in symptoms and increase in insight into coping skills/future focused  Outcome: Progressing Towards Goal  1515 Received patient resting in bed with eyes closed. NAD. On q 15 min. Safety checks. Drowsy. Unsteady gait. Two staff ambulated patient to DR for dinner. Patient ate 100% at dinner and snack time. BP running 89/57, 83/50,  88/53 . Called Daija Stephenson. He gave order to call Hospitalist for Thyroid and low BP management. Hospitalist saw patient. Patient has been cooperative.

## 2018-02-17 NOTE — IP AVS SNAPSHOT
110 [facility] [place] [address] 
[phone] Patient: Marvin Stout MRN: [id number] :1985 A check sukhdeep indicates which time of day the medication should be taken. My Medications CONTINUE taking these medications Instructions Each Dose to Equal  
 Morning Noon Evening Bedtime  
 fludrocortisone 0.1 mg tablet Commonly known as:  FLORINEF Your last dose was: Your next dose is: Take 2 Tabs by mouth daily. 0.2 mg  
    
   
   
   
  
 SYNTHROID 125 mcg tablet Generic drug:  levothyroxine Your last dose was: Your next dose is: TAKE 1/2  TABLET BY MOUTH DAILY ZOLOFT 25 mg tablet Generic drug:  sertraline Your last dose was: Your next dose is: Take 25 mg by mouth daily. 25 mg  
    
   
   
   
  
  
STOP taking these medications ADDERALL XR 20 mg XR capsule Generic drug:  amphetamine-dextroamphetamine XR  
   
  
 ASHWAGANDHA ROOT EXTRACT(BULK)  
   
  
 multivitamin tablet Commonly known as:  ONE A DAY

## 2018-02-17 NOTE — ROUTINE PROCESS
Bedside shift change report given to Marco Antonio (oncoming nurse) by Marshall Guillen (offgoing nurse). Report included the following information SBAR.

## 2018-02-17 NOTE — IP AVS SNAPSHOT
2700 [facility] [address] [place] [address] 
[phone] Patient: Chiquis Solorzano MRN: [id number] :1985 About your hospitalization You were admitted on:  2018 You last received care in the:  [address] [place] [address] You were discharged on:  2018 Why you were hospitalized Your primary diagnosis was:  Dizziness Your diagnoses also included: Adhd (Attention Deficit Hyperactivity Disorder), Major Depression Follow-up Information Follow up With Details Comments Contact Info Shawn Colon DO   [address] [place] [address] 
[phone] Discharge Orders None A check sukhdeep indicates which time of day the medication should be taken. My Medications ASK your doctor about these medications Instructions Each Dose to Equal  
 Morning Noon Evening Bedtime ADDERALL XR 20 mg XR capsule Generic drug:  amphetamine-dextroamphetamine XR Your last dose was: Your next dose is: Take 20 mg by mouth two (2) times a day. 20 mg  
    
   
   
   
  
 ASHWAGANDHA ROOT EXTRACT(BULK) Your last dose was: Your next dose is:    
   
   
 by Does Not Apply route daily. fludrocortisone 0.1 mg tablet Commonly known as:  FLORINEF Your last dose was: Your next dose is: Take 2 Tabs by mouth daily. 0.2 mg  
    
   
   
   
  
 multivitamin tablet Commonly known as:  ONE A DAY Your last dose was: Your next dose is: Take 1 Tab by mouth daily. 1 Tab SYNTHROID 125 mcg tablet Generic drug:  levothyroxine Your last dose was: Your next dose is: TAKE 1/2  TABLET BY MOUTH DAILY ZOLOFT 25 mg tablet Generic drug:  sertraline Your last dose was: Your next dose is: Take 25 mg by mouth daily. 25 mg Discharge Instructions None F[employer]bury Announcement We are excited to announce that we are making your provider's discharge notes available to you in Fosbury. You will see these notes when they are completed and signed by the physician that discharged you from your recent hospital stay. If you have any questions or concerns about any information you see in Fosbury, please call the Health Information Department where you were seen or reach out to your Primary Care Provider for more information about your plan of care. Introducing [facility] & HEALTH SERVICES! Dear Yuki De Leon: Thank you for requesting a Fosbury account. Our records indicate that you already have an active Fosbury account. You can access your account anytime at [email or web address] Did you know that you can access your hospital and ER discharge instructions at any time in Fosbury? You can also review all of your test results from your hospital stay or ER visit. Additional Information If you have questions, please visit the Frequently Asked Questions section of the Fosbury website at [email or web address] Remember, Fosbury is NOT to be used for urgent needs. For medical emergencies, dial 911. Now available from your iPhone and Android! Providers Seen During Your Hospitalization Provider Specialty Primary office phone Paul Porter MD Internal Medicine [phone] Your Primary Care Physician (PCP) Primary Care Physician Office Phone Office Fax Jorge Rhodes [phone] You are allergic to the following No active allergies Recent Documentation OB Status Smoking Status Having regular periods Never Smoker Emergency Contacts Name Discharge Info Relation Home Work Mobile Octavia Benavides CAREGIVER [3] Mother [14] [phone] Kirill To CAREGIVER [3] Friend [5] [phone] Patient Belongings The following personal items are in your possession at time of discharge: 
  Dental Appliances: None  Visual Aid: None      Home Medications: None   Jewelry: None  Clothing: At bedside    Other Valuables: None Please provide this summary of care documentation to your next provider. Signatures-by signing, you are acknowledging that this After Visit Summary has been reviewed with you and you have received a copy. Patient Signature:  ____________________________________________________________ Date:  ____________________________________________________________  
  
[facility] Provider Signature:  ____________________________________________________________ Date:  ____________________________________________________________

## 2018-02-17 NOTE — ROUTINE PROCESS
TRANSFER - OUT REPORT:    Verbal report given to Maritza (name) on [address]  being transferred to [facility] (unit) for routine progression of care       Report consisted of patients Situation, Background, Assessment and   Recommendations(SBAR). Information from the following report(s) SBAR, Kardex, ED Summary, Intake/Output and MAR was reviewed with the receiving nurse. Lines:       Opportunity for questions and clarification was provided. Patient transported with:   Registered Nurse  Tech     Discharge instructions reviewed with patient. Opportunities for questions provided. Iv removed. Patient discharged with all personal belongings.

## 2018-02-17 NOTE — H&P
[address]  ACUTE CARE HISTORY AND PHYSICAL    Taylor Bonilla  MR#: [id number]  : 1985  ACCOUNT #: [de-identified]   DATE OF SERVICE: 2018    PRIMARY CARE PHYSICIAN:  Dr. Mary Cruz.     SOURCE OF INFORMATION:  Patient. CHIEF COMPLAINT:  Dizziness and blurring of vision. HISTORY OF PRESENT ILLNESS:  This is a 41-year-old woman with a past medical history significant for sleep apnea, attention deficit hyperactivity disorder, orthostatic hypotension, depression, presented in the emergency room with a drug overdose on 2018. It was reported that the patient overdosed on a dog medication. She took two tablets of phenobarbital, but according to the report, it was reported that the patient took about 10 tablets of this medication. Patient was brought to the emergency room. She was subsequently evaluated in the Emergency Room, and cleared for admission to the psychiatric unit. The patient was admitted to the psychiatric unit for evaluation and treatment of major depression. Patient has history of Hashimoto encephalopathy. She was diagnosed with this disorder about three months ago and the patient is awaiting an outpatient evaluation by a neurologist at [facility]. The patient's blood pressure was borderline when she arrived to the emergency room. When the patient arrived by the psychiatric unit, the hypotension was persistent. Medical consult was requested by the psychiatry service for evaluation and treatment of the hypotension. Patient started complaining of headache, blurring of vision. No fever, no rigors, no chills. Given the patient's present condition, patient is not appropriate for the psychiatric unit. Request for consult was changed to admission to the medical service. This was discussed with the patient.   A psychiatric nurse was going to talk to the psychiatrist and discharge the patient from the psychiatric unit, the patient will be admitted to medicine on the hospitalist service. Once the patient is stable, the patient can then be discharged back to the psychiatric unit. PAST MEDICAL HISTORY:  Sleep apnea, attention deficit hyperactivity disorder, dizziness, orthostatic hypotension. ALLERGIES:  NO KNOWN DRUG ALLERGIES. MEDICATIONS:  Adderall XR 20 mg daily, Florinef 0.1 mg 2 tablets daily, multivitamin 1 tablet daily, Zoloft 25 mg daily, Synthroid 125 mcg half tablet daily. FAMILY HISTORY:  This was reviewed, is not pertinent to present illness. No family history of a neurological disorder. PAST SURGICAL HISTORY:  Not significant. SOCIAL HISTORY:  No history of alcohol or tobacco abuse. Patient denies use of illicit drugs. REVIEW OF SYSTEMS:    HEAD, EYES, EARS, NOSE AND THROAT:  This is positive for dizziness and headache, blurring of vision, no photophobia. RESPIRATORY:  No cough, no shortness of breath. No hemoptysis. CARDIOVASCULAR:  No chest pain, orthopnea. No palpitations. GASTROINTESTINAL:  No nausea and vomiting, no diarrhea, no constipation. GENITOURINARY SYSTEM:  No dysuria, no urgency and no frequency. All other systems are reviewed and they are negative. PHYSICAL EXAMINATION:  GENERAL:  Patient appeared ill and in moderate distress. VITAL SIGNS:  Temperature 94.7, pulse of 60, blood pressure 89/57, respiratory rate 16, oxygen saturation 100%. HEAD: Normocephalic, atraumatic. EYES:  Normal eye movements. No redness, no drainage, no discharge. EARS:  Normal external ears with no obvious drainage. NOSE:  No deformity and no drainage. MOUTH AND THROAT:  No visible oral lesions. Dry oral mucosa. NECK:  Supple, no JVD, no thyromegaly. CHEST:  Clear breath sounds. No wheezing, no crackles. HEART:  Normal S1 and S2, regular. No clinically appreciable murmur. ABDOMEN:  Soft, nontender, normal bowel sounds. CENTRAL NERVOUS SYSTEM:  Alert and oriented x3.   No gross focal neurological deficits. EXTREMITIES:  No edema. Pulses 2+ bilaterally. MUSCULOSKELETAL:  No obvious joint deformity or swelling. SKIN:  No active skin lesions seen in the exposed part of the body. PSYCHIATRY:  Unable to assess mood and affect. LYMPHATIC SYSTEM:  No cervical lymphadenopathy. DIAGNOSTIC DATA:  A CT scan of the head without contrast negative. LABORATORY DATA:  Urinalysis is significant for negative nitrite, negative leukocyte esterase, negative bacteria. Urine pregnancy test negative. Hematology:  WBC 5.4, hemoglobin 12.9, hematocrit 39.0, platelet 533. Chemistry:  Sodium 140, potassium 3.6, chloride 104, CO2 37, glucose 58, BUN 14, creatinine 0.64, calcium 8.3, bilirubin total 0.7, total protein 6.8, albumin level 3.4, globulin 3.4, ALT 18, AST 8, alkaline phosphatase 27. Acetaminophen level less than 2, salicylate level less than 1.7. Phenobarbital level 27.8. Serum alcohol level less than 10. Urine drug screen, this is positive for acetaminophen, barbiturate and cannabinoid. ASSESSMENT:  1.  Major depression with a suicide attempt. 2.  Hypothyroidism. 3.  Attention deficit hyperactivity disorder. 4.  Orthostatic hypotension. 5.  Marijuana use. 6.  Dizziness. PLAN:  1. Major depression with suicide ideation. We will admit the patient for further evaluation and treatment. Psychiatry consult to be requested for continuation of care for major depression with suicide ideation, we will request for a suicide precaution. 2.  Hypothyroidism. We will continue with Synthroid. TSH is within normal limits. 3.  Attention deficit hyperactivity disorder. We will hold the preadmission medication since the patient is complaining of dizziness until after evaluation by the neurologist.   4.  Orthostatic hypotension. We will continue with her preadmission medication. We will also carry out hydration with normal saline. Monitor the patient closely. 5.  Marijuana use.   The patient advised to quit, we will carry out supportive therapy. 6.  Dizziness. Patient is being admitted to the medical service because of the dizziness. This could be coming from the patient's orthostatic hypotension. A CT scan of the head done in the emergency room is negative. Neurology consult will be requested for further evaluation and treatment. We will hold off on MRI of the brain and ordering imaging study until the patient has been seen by the neurologist.    7.  Other issues:  CODE STATUS:  THE PATIENT IS A FULL CODE. We will place the patient on the Lovenox for DVT prophylaxis.       Harika Blanc MD       RE / INDY  D: 02/17/2018 01:25     T: 02/17/2018 08:37  JOB #: [id number]  CC: STEPHEN SIEGRIST DO

## 2018-02-17 NOTE — DISCHARGE SUMMARY
Discharge Summary       PATIENT ID: Chiquis Solorzano  MRN: [id number]   YOB: 1985    DATE OF ADMISSION: 2/17/2018  1:47 AM    DATE OF DISCHARGE: 2/17/18   PRIMARY CARE PROVIDER: Shawn Colon DO     ATTENDING PHYSICIAN: Enoch Grider MD, MHS  DISCHARGING PROVIDER: Enoch Grider MD, S    To contact this individual call [phone] and ask the  to page. If unavailable ask to be transferred the Adult Hospitalist Department. CONSULTATIONS: IP CONSULT TO NEUROLOGY  IP CONSULT TO PSYCHIATRY    PROCEDURES/SURGERIES: * No surgery found *  2/16 CT head wo contrast    [address] COURSE:   30 yo woman with depression with h/o suicidal attempt, Hashimoto encephalopathy, sleep apnea, ADHD, and orthostatic hypotension was transferred from [address] inpatient remote telemetry bed on 2/16/18 with phenobarbital overdose and persistent hypotension. Patient states that she took the phenobarbital because she was having trouble sleeping. Pt states that she has never taken phenobarbital before. Pt states that the phenobarbital was her dog's medication.     Persistent hypotension and dizziness (POA) with h/o orthostatic hypotension   - orthostatic VS negative  - home fludricortisone resumed  - IVF stopped due to elevated BP from anxiety     Phenobarbital overdose (PTA)  - pt reports taking her dog's medication to help her sleep  - would not confirm nor deny if she was having SI currently  - phenobarb level WNL  - Poison Control contacted and signed off  - Psych consulted     Major depression   - resumed sertraline  - Psych consulted     Hypothyroidism with h/o Hashimoto encephalopathy  - TSH WNL  - scheduled [place] [facility]  - resumed home levothyroxine     ADHD - resume home Adderall XR; UDS + amphetamines     Illicit drug use - UDS positive for THC, barbiturates     Sleep apnea - f/u outpatient       DISCHARGE DIAGNOSES / PLAN:      Stable for discharge and readmit to [facility] inpatient behavioral health unit. PENDING TEST RESULTS:   At the time of discharge the following test results are still pending: none    FOLLOW UP APPOINTMENTS:    Follow-up Information     Follow up With Details Comments [address] [place] Ch[place]vez  [phone]             ADDITIONAL CARE RECOMMENDATIONS:   1. Take medications as prescribed. 2. Keep appointment(s) as recommended/scheduled. 3. May consider prn meclizine (if no contraindications) for vertigo. DIET: Regular Diet    ACTIVITY: Activity as tolerated    WOUND CARE: none    EQUIPMENT needed: none      DISCHARGE MEDICATIONS:  Current Discharge Medication List        Current Outpatient Prescriptions on File Prior to Encounter   Medication Sig Dispense Refill    multivitamin (ONE A DAY) tablet Take 1 Tab by mouth daily.  ASHWAGANDHA ROOT EXTRACT,BULK, by Does Not Apply route daily.  amphetamine-dextroamphetamine XR (ADDERALL XR) 20 mg XR capsule Take 20 mg by mouth two (2) times a day.  sertraline (ZOLOFT) 25 mg tablet Take 25 mg by mouth daily.  fludrocortisone (FLORINEF) 0.1 mg tablet Take 2 Tabs by mouth daily. 180 Tab 1    SYNTHROID 125 mcg tablet TAKE 1/2  TABLET BY MOUTH DAILY  6     NOTIFY YOUR PHYSICIAN FOR ANY OF THE FOLLOWING:   Fever over 101 degrees for 24 hours. Chest pain, shortness of breath, fever, chills, nausea, vomiting, diarrhea, change in mentation, falling, weakness, bleeding. Severe pain or pain not relieved by medications. Or, any other signs or symptoms that you may have questions about.     DISPOSITION:    Home With:   OT  PT  HH  RN       Long term SNF/Inpatient Rehab    Independent/assisted living    Hospice   x  Discharge and readmit to [address] AT DISCHARGE:     Functional status    Poor     Deconditioned    x Independent      Cognition   x  Lucid     Forgetful     Dementia Catheters/lines (plus indication)    Yoo     PICC     PEG    x None      Code status    x Full code     DNR      PHYSICAL EXAMINATION AT DISCHARGE:  Visit Vitals    BP (!) 142/97 (BP 1 Location: Left arm, BP Patient Position: At rest)    Pulse 84    Temp 98.3 °F (36.8 °C)    Resp 19    SpO2 99%     Constitutional:  awake, no acute distress, cooperative, pleasant    ENT:  oral mucosa moist, oropharynx benign  Neck supple, no masses   Resp:  CTA bilaterally, no wheezing/rhonchi/rales   CV:  regular rhythm, normal rate, no m/r/g appreciated, no edema, +pulses    GI:  +BS, soft, non distended, non tender     Musculoskeletal:  moves all extremities    Neurologic:  AAOx3, NFD                                             Skin:  warm, dry  Eyes:  PERRL      Labs  Recent Results (from the past 24 hour(s))   METABOLIC PANEL, COMPREHENSIVE    Collection Time: 02/17/18  2:34 AM   Result Value Ref Range    Sodium 141 136 - 145 mmol/L    Potassium 3.7 3.5 - 5.1 mmol/L    Chloride 106 97 - 108 mmol/L    CO2 29 21 - 32 mmol/L    Anion gap 6 5 - 15 mmol/L    Glucose 124 (H) 65 - 100 mg/dL    BUN 12 6 - 20 MG/DL    Creatinine 0.69 0.55 - 1.02 MG/DL    BUN/Creatinine ratio 17 12 - 20      GFR est AA >60 >60 ml/min/1.73m2    GFR est non-AA >60 >60 ml/min/1.73m2    Calcium 8.0 (L) 8.5 - 10.1 MG/DL    Bilirubin, total 0.4 0.2 - 1.0 MG/DL    ALT (SGPT) 17 12 - 78 U/L    AST (SGOT) 11 (L) 15 - 37 U/L    Alk.  phosphatase 31 (L) 45 - 117 U/L    Protein, total 5.9 (L) 6.4 - 8.2 g/dL    Albumin 2.9 (L) 3.5 - 5.0 g/dL    Globulin 3.0 2.0 - 4.0 g/dL    A-G Ratio 1.0 (L) 1.1 - 2.2     LIPID PANEL    Collection Time: 02/17/18  2:34 AM   Result Value Ref Range    LIPID PROFILE          Cholesterol, total 153 <200 MG/DL    Triglyceride 119 <150 MG/DL    HDL Cholesterol 62 MG/DL    LDL, calculated 67.2 0 - 100 MG/DL    VLDL, calculated 23.8 MG/DL    CHOL/HDL Ratio 2.5 0 - 5.0     CBC WITH AUTOMATED DIFF    Collection Time: 02/17/18  2:34 AM Result Value Ref Range    WBC 4.8 3.6 - 11.0 K/uL    RBC 4.12 3.80 - 5.20 M/uL    HGB 12.4 11.5 - 16.0 g/dL    HCT 36.5 35.0 - 47.0 %    MCV 88.6 80.0 - 99.0 FL    MCH 30.1 26.0 - 34.0 PG    MCHC 34.0 30.0 - 36.5 g/dL    RDW 13.4 11.5 - 14.5 %    PLATELET 605 (L) 066 - 400 K/uL    MPV 11.9 8.9 - 12.9 FL    NRBC 0.0 0  WBC    ABSOLUTE NRBC 0.00 0.00 - 0.01 K/uL    NEUTROPHILS 46 32 - 75 %    LYMPHOCYTES 41 12 - 49 %    MONOCYTES 9 5 - 13 %    EOSINOPHILS 4 0 - 7 %    BASOPHILS 0 0 - 1 %    IMMATURE GRANULOCYTES 0 0.0 - 0.5 %    ABS. NEUTROPHILS 2.2 1.8 - 8.0 K/UL    ABS. LYMPHOCYTES 2.0 0.8 - 3.5 K/UL    ABS. MONOCYTES 0.4 0.0 - 1.0 K/UL    ABS. EOSINOPHILS 0.2 0.0 - 0.4 K/UL    ABS. BASOPHILS 0.0 0.0 - 0.1 K/UL    ABS. IMM.  GRANS. 0.0 0.00 - 0.04 K/UL    DF AUTOMATED     MAGNESIUM    Collection Time: 02/17/18  2:34 AM   Result Value Ref Range    Magnesium 2.1 1.6 - 2.4 mg/dL   PHOSPHORUS    Collection Time: 02/17/18  2:34 AM   Result Value Ref Range    Phosphorus 4.0 2.6 - 4.7 MG/DL     CHRONIC MEDICAL DIAGNOSES:  Problem List as of 2/17/2018  Date Reviewed: 2/17/2018          Codes Class Noted - Resolved    Major depression ICD-10-CM: F32.9  ICD-9-CM: 296.20  2/16/2018 - Present        Orthostatic hypotension ICD-10-CM: I95.1  ICD-9-CM: 458.0  1/5/2018 - Present        Shuffling gait ICD-10-CM: R26.89  ICD-9-CM: 781.2  10/18/2016 - Present    Overview Signed 10/18/2016 11:08 AM by Broderick Hutchins NP     --intermittent when head was shaking/bobbing              Migraine with aura, without mention of intractable migraine without mention of status migrainosus ICD-10-CM: T56.944  ICD-9-CM: 346.00  3/31/2015 - Present        Memory loss ICD-10-CM: R41.3  ICD-9-CM: 780.93  3/31/2015 - Present        Ataxia ICD-10-CM: R27.0  ICD-9-CM: 781.3  3/31/2015 - Present        Sleep apnea (Chronic) ICD-10-CM: G47.30  ICD-9-CM: 780.57  3/31/2015 - Present        ADHD (attention deficit hyperactivity disorder) (Chronic) ICD-10-CM: F90.9  ICD-9-CM: 314.01  3/31/2015 - Present        B12 deficiency ICD-10-CM: E53.8  ICD-9-CM: 266.2  3/31/2015 - Present        Osteoporosis ICD-10-CM: M81.0  ICD-9-CM: 733.00  3/31/2015 - Present        * (Principal)Dizziness ICD-10-CM: A70  ICD-9-CM: 780.4  1/23/2015 - Present        RESOLVED: Dizziness and giddiness ICD-10-CM: R42  ICD-9-CM: 780.4  3/31/2015 - 10/17/2016              Greater than 30 minutes were spent with the patient on counseling and coordination of care.     Signed:   Kamran Watson MD  2/17/2018  1:51 PM

## 2018-02-17 NOTE — PROGRESS NOTES
Hospitalist Progress Note  Darrel Grant MD  Answering service: [phone] OR [phone] from in house phone      Date of Service:  2018  NAME:  Thelma Urena  :  1985  MRN:  [id number]      Admission Summary:   28 yo woman with depression with h/o suicidal attempt, Hashimoto encephalopathy, sleep apnea, ADHD, and orthostatic hypotension was transferred from [place] [address] to [address] [place] [address] inpatient remote telemetry bed on 18 with phenobarbital overdose and persistent hypotension. Patient states that she took the phenobarbital because she was having trouble sleeping. Pt states that she has never taken phenobarbital before. Pt states that the phenobarbital was her dog's medication. Interval history / Subjective:    still c/o dizziness and \"eyes moving\" and when asked about suicidal thoughts, pt stated \"I don't want to talk about it. \" D/w nurse Marty Guillaume, no acute events overnight     Assessment & Plan:     Persistent hypotension and dizziness (POA) with h/o orthostatic hypotension   - orthostatic VS negative  - home fludricortisone resumed  - IVF stopped due to elevated BP    Phenobarbital overdose (PTA)  - pt reports taking her dog's medication to help her sleep and had initially denied SI  - phenobarb level WNL  - Poison Control contacted and signed off    Major depression   - resumed sertraline  - Psych consulted    Hypothyroidism with h/o Hashimoto encephalopathy  - TSH WNL  - scheduled for outpatient f/u with [facility]  - resumed home levothyroxine    ADHD - resume home Adderall XR; UDS + amphetamines    Illicit drug use - UDS positive for THC, barbiturates    Sleep apnea - f/u outpatient    Code status: full  DVT prophylaxis: enoxaparin    Care Plan discussed with: Patient/Family and Nurse  Disposition: TBD.  Medically stable for discharge to [address] [place] [address] inpatient psych when bed available     Hospital Problems  Date Reviewed: 2018          Codes Class Noted POA    * (Principal)Dizziness ICD-10-CM: G48  ICD-9-CM: 780.4  1/23/2015 Yes            Review of Systems:   Pertinent items are noted in HPI. Vital Signs:    Last 24hrs VS reviewed since prior progress note. Most recent are:  Visit Vitals    BP 90/55    Pulse 64    Temp 97.7 °F (36.5 °C)    Resp 16    SpO2 97%     No intake or output data in the 24 hours ending 02/17/18 0845     Physical Examination:     Constitutional:  awake, no acute distress, cooperative, pleasant    ENT:  oral mucosa moist, oropharynx benign  Neck supple, no masses   Resp:  CTA bilaterally, no wheezing/rhonchi/rales   CV:  regular rhythm, normal rate, no m/r/g appreciated, no edema, +pulses    GI:  +BS, soft, non distended, non tender     Musculoskeletal:  moves all extremities    Neurologic:  AAOx3, NFD     Skin:  warm, dry  Eyes:  PERRL    Data Review:    Review and/or order of clinical lab test  Review and/or order of tests in the radiology section of CPT  Review and/or order of tests in the medicine section of CPT    Labs:     Recent Labs      02/17/18   0234  02/15/18   2357   WBC  4.8  5.4   HGB  12.4  12.9   HCT  36.5  39.0   PLT  146*  160     Recent Labs      02/17/18   0234  02/15/18   2357   NA  141  140   K  3.7  3.6   CL  106  104   CO2  29  27   BUN  12  14   CREA  0.69  0.64   GLU  124*  68   CA  8.0*  8.3*   MG  2.1   --    PHOS  4.0   --      Recent Labs      02/17/18   0234  02/15/18   2357   SGOT  11*  8*   ALT  17  18   AP  31*  27*   TBILI  0.4  0.7   TP  5.9*  6.8   ALB  2.9*  3.4*   GLOB  3.0  3.4     No results for input(s): INR, PTP, APTT in the last 72 hours. No lab exists for component: INREXT   No results for input(s): FE, TIBC, PSAT, FERR in the last 72 hours. Lab Results   Component Value Date/Time    Folate 14.0 10/19/2016 05:29 AM      No results for input(s): PH, PCO2, PO2 in the last 72 hours. No results for input(s): CPK, CKNDX, TROIQ in the last 72 hours.     No lab exists for component: CPKMB  Lab Results   Component Value Date/Time    Cholesterol, total 153 02/17/2018 02:34 AM    HDL Cholesterol 62 02/17/2018 02:34 AM    LDL, calculated 67.2 02/17/2018 02:34 AM    Triglyceride 119 02/17/2018 02:34 AM    CHOL/HDL Ratio 2.5 02/17/2018 02:34 AM     No results found for: Angel Rangel  Lab Results   Component Value Date/Time    Color YELLOW/STRAW 02/16/2018 01:04 AM    Appearance CLEAR 02/16/2018 01:04 AM    Specific gravity 1.024 02/16/2018 01:04 AM    Specific gravity 1.020 10/17/2016 04:57 PM    pH (UA) 6.5 02/16/2018 01:04 AM    Protein NEGATIVE  02/16/2018 01:04 AM    Glucose NEGATIVE  02/16/2018 01:04 AM    Ketone TRACE (A) 02/16/2018 01:04 AM    Bilirubin NEGATIVE  02/16/2018 01:04 AM    Urobilinogen 0.2 02/16/2018 01:04 AM    Nitrites NEGATIVE  02/16/2018 01:04 AM    Leukocyte Esterase NEGATIVE  02/16/2018 01:04 AM    Epithelial cells FEW 02/16/2018 01:04 AM    Bacteria NEGATIVE  02/16/2018 01:04 AM    WBC 0-4 02/16/2018 01:04 AM    RBC 0-5 02/16/2018 01:04 AM     Medications Reviewed:     Current Facility-Administered Medications   Medication Dose Route Frequency    fludrocortisone (FLORINEF) tablet 200 mcg  0.2 mg Oral DAILY    levothyroxine (SYNTHROID) tablet 62.5 mcg  62.5 mcg Oral 6am    sodium chloride (NS) flush 5-10 mL  5-10 mL IntraVENous Q8H    sodium chloride (NS) flush 5-10 mL  5-10 mL IntraVENous PRN    0.9% sodium chloride infusion  125 mL/hr IntraVENous CONTINUOUS    acetaminophen (TYLENOL) tablet 650 mg  650 mg Oral Q4H PRN    ondansetron (ZOFRAN) injection 4 mg  4 mg IntraVENous Q4H PRN    docusate sodium (COLACE) capsule 100 mg  100 mg Oral BID    enoxaparin (LOVENOX) injection 40 mg  40 mg SubCUTAneous Q24H     ______________________________________________________________________  EXPECTED LENGTH OF STAY: - - -  ACTUAL LENGTH OF STAY:          0                 Jackson Wylie MD

## 2018-02-18 PROCEDURE — 74011250637 HC RX REV CODE- 250/637: Performed by: INTERNAL MEDICINE

## 2018-02-18 PROCEDURE — 65220000003 HC RM SEMIPRIVATE PSYCH

## 2018-02-18 RX ADMIN — DOCUSATE SODIUM 100 MG: 100 CAPSULE, LIQUID FILLED ORAL at 09:54

## 2018-02-18 RX ADMIN — SERTRALINE HYDROCHLORIDE 25 MG: 50 TABLET ORAL at 09:54

## 2018-02-18 RX ADMIN — DOCUSATE SODIUM 100 MG: 100 CAPSULE, LIQUID FILLED ORAL at 16:58

## 2018-02-18 RX ADMIN — LEVOTHYROXINE SODIUM 62.5 MCG: 25 TABLET ORAL at 05:50

## 2018-02-18 RX ADMIN — FLUDROCORTISONE ACETATE 200 MCG: 0.1 TABLET ORAL at 09:53

## 2018-02-18 NOTE — BH NOTES
Pt received awake in bed. Shortly after initial round pt out to request prn sleep aid. Vital signs stable at this time. Denies si/hi and is not endorsing ah/vh. Affect is sad mood is depressed. Continue to monitor and support. 24 hour chart check complete. PRN Medication Documentation    Specific patient behavior that led to need for PRN medication: c/o insomnia  Staff interventions attempted prior to PRN being given: lights dim, milieu quiet  PRN medication given: Ambien 5 mg po  Patient response/effectiveness of PRN medication: upon reassessment, pt is sleeping soundly. No acute distress noted. 0010-Pt's friend [employer] earlier to discuss patients care. This individual wanted staff to call physician and have CMP ordered due to patients \"low albumin\"  Writer initially advised caller that there was no signature on a release of information authorizing staff to discuss treatment. Caller later phoned unit at [id number] and again asked if physician was contacted and order was obtained for CMP. Writer again informed caller that he was not on release of information and that his message would be passed along to the treatment team in the am.  Nancy Riojas stated \"well I see that she just got Ambien a few moments ago\" writer then questioned caller about weather he was her outpatient provider ( to which he stated he was just a friend) and if he was looking in the chart while on phone (to which he answered in the affirmative). Writer reminded caller of HIPPA laws and that he should not be in patients chart if he is not a direct member of the care team to which caller then asked for writers full name. Writer provided caller with his first name as he did when he answered the phone and did not disclose last name. Nursing supervisor notified and message left for nurse manager.

## 2018-02-18 NOTE — PROGRESS NOTES
Problem: Depressed Mood (Adult/Pediatric)  Goal: *STG: Remains safe in hospital  Outcome: Progressing Towards Goal  1535: Greeted patient on unit in room resting quietly. No voiced concerns at present   Will continue to monitor on Q 15 minute safety checks. Patient alert, out on unit. Ambulating in hallway with no assist.   Medication and meal compliant. Attended Reflections and Recovery group. Cooperative with staff and peers.

## 2018-02-18 NOTE — CONSULTS
PSYCHIATRIC CONSULTATION                         IDENTIFICATION:    Patient Name  Moishe Cockayne   Date of Birth 1985   [facility] [id number]   Medical Record Number  [id number]      Age  29 y.o. PCP Mark Brizuela DO   Admit date:  2/17/2018    Room Number  727/02  @ [facility]   Date of Service  2/17/2018            HISTORY         REASON FOR HOSPITALIZATION/CONSULTATION:  A psychiatric consultation was requested by Kika Fonseca MD to evaluate or provide advice/opinion related to evaluating for depression   CC: feeling depressed    HISTORY OF PRESENT ILLNESS:    The patient, Moishe Cockayne, is a 29 y.o. WHITE [place]  female with a past psychiatric history significant for depression who was admitted to the medical floor for the treatment of low blood pressure , she stated she does not get along with her mother and brother that they do not believe she is physically sick and she is  Upset and feels  Down and she stated she lied about her suicidal attempt and she was trying to get help from here but she does not want to go to Psych floor , she stated she is not suicidal now . She denied psychotic features     Pt seen today for evaluation. ALLERGIES:  No Known Allergies   MEDICATIONS PRIOR TO ADMISSION:  Prescriptions Prior to Admission   Medication Sig    sertraline (ZOLOFT) 25 mg tablet Take 25 mg by mouth daily.  fludrocortisone (FLORINEF) 0.1 mg tablet Take 2 Tabs by mouth daily.  SYNTHROID 125 mcg tablet TAKE 1/2  TABLET BY MOUTH DAILY    multivitamin (ONE A DAY) tablet Take 1 Tab by mouth daily.  ASHWAGANDHA ROOT EXTRACT,BULK, by Does Not Apply route daily.  amphetamine-dextroamphetamine XR (ADDERALL XR) 20 mg XR capsule Take 20 mg by mouth two (2) times a day.       PAST MEDICAL HISTORY:  Past Medical History:   Diagnosis Date    Depression     Hashimoto's encephalopathy     Hypothyroid     Hypothyroid     Insomnia     Migraine     Shuffling gait 10/18/2016 --intermittent when head was shaking/bobbing     Vertigo    No past surgical history on file. SOCIAL HISTORY: single   Social History     Social History    Marital status: SINGLE     Spouse name: N/A    Number of children: N/A    Years of education: N/A     Occupational History    Not on file. Social History Main Topics    Smoking status: Never Smoker    Smokeless tobacco: Never Used    Alcohol use Yes      Comment: rarely    Drug use: No    Sexual activity: Not on file     Other Topics Concern    Not on file     Social History Narrative      FAMILY HISTORY: History reviewed. No pertinent family history. Family History   Problem Relation Age of Onset    Heart Disease Maternal Grandfather     Cancer Paternal Grandmother     Heart Disease Paternal Grandmother        REVIEW of SYSTEMS:   History obtained from the patient  Pertinent items are noted in the History of Present Illness. All other Systems reviewed and are considered negative. MENTAL STATUS EXAM & VITALS       MENTAL STATUS EXAM (MSE):    MSE FINDINGS ARE WITHIN NORMAL LIMITS (WNL) UNLESS OTHERWISE STATED BELOW. Orientation oriented to time, place and person   Vital Signs (BP,Pulse, Temp) See below (reviewed 2/17/2018); vital signs are WNL if not listed below.    Gait and Station Stable, no ataxia   Abnormal Muscular Movements/Tone/Behavior No EPS, no Tardive Dyskinesia, no abnormal muscular movements; wnl tone   Relations cooperative   General Appearance:  age appropriate   Language No aphasia or dysarthria   Speech:  normal pitch and normal volume   Thought Processes logical, wnl rate of thoughts, good abstract reasoning and computation   Thought Associations goal directed   Thought Content free of delusions   Suicidal Ideations no plan  and no intention   Homicidal Ideations no plan  and no intention   Mood:  depressed   Affect:  anxious   Memory recent  adequate   Memory remote:  adequate   Concentration/Attention: adequate   Fund of Knowledge average   Insight:  good   Reliability fair   Judgment:  good            VITALS:     Patient Vitals for the past 24 hrs:   Temp Pulse Resp BP SpO2   02/17/18 1540 98.1 °F (36.7 °C) 82 18 132/77 100 %              DATA     LABORATORY DATA:  Labs Reviewed - No data to display  Admission on 02/17/2018, Discharged on 02/17/2018   Component Date Value Ref Range Status    Sodium 02/17/2018 141  136 - 145 mmol/L Final    Potassium 02/17/2018 3.7  3.5 - 5.1 mmol/L Final    Chloride 02/17/2018 106  97 - 108 mmol/L Final    CO2 02/17/2018 29  21 - 32 mmol/L Final    Anion gap 02/17/2018 6  5 - 15 mmol/L Final    Glucose 02/17/2018 124* 65 - 100 mg/dL Final    BUN 02/17/2018 12  6 - 20 MG/DL Final    Creatinine 02/17/2018 0.69  0.55 - 1.02 MG/DL Final    BUN/Creatinine ratio 02/17/2018 17  12 - 20   Final    GFR est AA 02/17/2018 >60  >60 ml/min/1.73m2 Final    GFR est non-AA 02/17/2018 >60  >60 ml/min/1.73m2 Final    Calcium 02/17/2018 8.0* 8.5 - 10.1 MG/DL Final    Bilirubin, total 02/17/2018 0.4  0.2 - 1.0 MG/DL Final    ALT (SGPT) 02/17/2018 17  12 - 78 U/L Final    AST (SGOT) 02/17/2018 11* 15 - 37 U/L Final    Alk.  phosphatase 02/17/2018 31* 45 - 117 U/L Final    Protein, total 02/17/2018 5.9* 6.4 - 8.2 g/dL Final    Albumin 02/17/2018 2.9* 3.5 - 5.0 g/dL Final    Globulin 02/17/2018 3.0  2.0 - 4.0 g/dL Final    A-G Ratio 02/17/2018 1.0* 1.1 - 2.2   Final    LIPID PROFILE 02/17/2018        Final    Cholesterol, total 02/17/2018 153  <200 MG/DL Final    Triglyceride 02/17/2018 119  <150 MG/DL Final    HDL Cholesterol 02/17/2018 62  MG/DL Final    LDL, calculated 02/17/2018 67.2  0 - 100 MG/DL Final    VLDL, calculated 02/17/2018 23.8  MG/DL Final    CHOL/HDL Ratio 02/17/2018 2.5  0 - 5.0   Final    WBC 02/17/2018 4.8  3.6 - 11.0 K/uL Final    RBC 02/17/2018 4.12  3.80 - 5.20 M/uL Final    HGB 02/17/2018 12.4  11.5 - 16.0 g/dL Final    HCT 02/17/2018 36.5  35.0 - 47.0 % Final    MCV 02/17/2018 88.6  80.0 - 99.0 FL Final    MCH 02/17/2018 30.1  26.0 - 34.0 PG Final    MCHC 02/17/2018 34.0  30.0 - 36.5 g/dL Final    RDW 02/17/2018 13.4  11.5 - 14.5 % Final    PLATELET 22/66/1927 049* 150 - 400 K/uL Final    MPV 02/17/2018 11.9  8.9 - 12.9 FL Final    NRBC 02/17/2018 0.0  0  WBC Final    ABSOLUTE NRBC 02/17/2018 0.00  0.00 - 0.01 K/uL Final    NEUTROPHILS 02/17/2018 46  32 - 75 % Final    LYMPHOCYTES 02/17/2018 41  12 - 49 % Final    MONOCYTES 02/17/2018 9  5 - 13 % Final    EOSINOPHILS 02/17/2018 4  0 - 7 % Final    BASOPHILS 02/17/2018 0  0 - 1 % Final    IMMATURE GRANULOCYTES 02/17/2018 0  0.0 - 0.5 % Final    ABS. NEUTROPHILS 02/17/2018 2.2  1.8 - 8.0 K/UL Final    ABS. LYMPHOCYTES 02/17/2018 2.0  0.8 - 3.5 K/UL Final    ABS. MONOCYTES 02/17/2018 0.4  0.0 - 1.0 K/UL Final    ABS. EOSINOPHILS 02/17/2018 0.2  0.0 - 0.4 K/UL Final    ABS. BASOPHILS 02/17/2018 0.0  0.0 - 0.1 K/UL Final    ABS. IMM.  GRANS. 02/17/2018 0.0  0.00 - 0.04 K/UL Final    DF 02/17/2018 AUTOMATED    Final    Magnesium 02/17/2018 2.1  1.6 - 2.4 mg/dL Final    Phosphorus 02/17/2018 4.0  2.6 - 4.7 MG/DL Final   Admission on 02/15/2018, Discharged on 02/17/2018   Component Date Value Ref Range Status    Color 02/16/2018 YELLOW/STRAW    Final    Appearance 02/16/2018 CLEAR  CLEAR   Final    Specific gravity 02/16/2018 1.024  1.003 - 1.030   Final    pH (UA) 02/16/2018 6.5  5.0 - 8.0   Final    Protein 02/16/2018 NEGATIVE   NEG mg/dL Final    Glucose 02/16/2018 NEGATIVE   NEG mg/dL Final    Ketone 02/16/2018 TRACE* NEG mg/dL Final    Bilirubin 02/16/2018 NEGATIVE   NEG   Final    Blood 02/16/2018 NEGATIVE   NEG   Final    Urobilinogen 02/16/2018 0.2  0.2 - 1.0 EU/dL Final    Nitrites 02/16/2018 NEGATIVE   NEG   Final    Leukocyte Esterase 02/16/2018 NEGATIVE   NEG   Final    WBC 02/16/2018 0-4  0 - 4 /hpf Final    RBC 02/16/2018 0-5  0 - 5 /hpf Final    Epithelial cells 02/16/2018 FEW  FEW /lpf Final    Bacteria 02/16/2018 NEGATIVE   NEG /hpf Final    Hyaline cast 02/16/2018 0-2  0 - 5 /lpf Final    Urine culture hold 02/16/2018 URINE ON HOLD IN MICROBIOLOGY DEPT FOR 3 DAYS. IF UNPRESERVED URINE IS SUBMITTED, IT CANNOT BE USED FOR ADDITIONAL TESTING AFTER 24 HRS, RECOLLECTION WILL BE REQUIRED. Final    WBC 02/15/2018 5.4  3.6 - 11.0 K/uL Final    RBC 02/15/2018 4.34  3.80 - 5.20 M/uL Final    HGB 02/15/2018 12.9  11.5 - 16.0 g/dL Final    HCT 02/15/2018 39.0  35.0 - 47.0 % Final    MCV 02/15/2018 89.9  80.0 - 99.0 FL Final    MCH 02/15/2018 29.7  26.0 - 34.0 PG Final    MCHC 02/15/2018 33.1  30.0 - 36.5 g/dL Final    RDW 02/15/2018 13.4  11.5 - 14.5 % Final    PLATELET 54/67/1933 623  150 - 400 K/uL Final    MPV 02/15/2018 12.1  8.9 - 12.9 FL Final    NRBC 02/15/2018 0.0  0  WBC Final    ABSOLUTE NRBC 02/15/2018 0.00  0.00 - 0.01 K/uL Final    NEUTROPHILS 02/15/2018 51  32 - 75 % Final    LYMPHOCYTES 02/15/2018 37  12 - 49 % Final    MONOCYTES 02/15/2018 8  5 - 13 % Final    EOSINOPHILS 02/15/2018 4  0 - 7 % Final    BASOPHILS 02/15/2018 0  0 - 1 % Final    IMMATURE GRANULOCYTES 02/15/2018 0  0.0 - 0.5 % Final    ABS. NEUTROPHILS 02/15/2018 2.8  1.8 - 8.0 K/UL Final    ABS. LYMPHOCYTES 02/15/2018 2.0  0.8 - 3.5 K/UL Final    ABS. MONOCYTES 02/15/2018 0.5  0.0 - 1.0 K/UL Final    ABS. EOSINOPHILS 02/15/2018 0.2  0.0 - 0.4 K/UL Final    ABS. BASOPHILS 02/15/2018 0.0  0.0 - 0.1 K/UL Final    ABS. IMM.  GRANS. 02/15/2018 0.0  0.00 - 0.04 K/UL Final    DF 02/15/2018 AUTOMATED    Final    Sodium 02/15/2018 140  136 - 145 mmol/L Final    Potassium 02/15/2018 3.6  3.5 - 5.1 mmol/L Final    Chloride 02/15/2018 104  97 - 108 mmol/L Final    CO2 02/15/2018 27  21 - 32 mmol/L Final    Anion gap 02/15/2018 9  5 - 15 mmol/L Final    Glucose 02/15/2018 68  65 - 100 mg/dL Final    BUN 02/15/2018 14  6 - 20 MG/DL Final    Creatinine 02/15/2018 0.64 0.55 - 1.02 MG/DL Final    BUN/Creatinine ratio 02/15/2018 22* 12 - 20   Final    GFR est AA 02/15/2018 >60  >60 ml/min/1.73m2 Final    GFR est non-AA 02/15/2018 >60  >60 ml/min/1.73m2 Final    Calcium 02/15/2018 8.3* 8.5 - 10.1 MG/DL Final    Bilirubin, total 02/15/2018 0.7  0.2 - 1.0 MG/DL Final    ALT (SGPT) 02/15/2018 18  12 - 78 U/L Final    AST (SGOT) 02/15/2018 8* 15 - 37 U/L Final    Alk.  phosphatase 02/15/2018 27* 45 - 117 U/L Final    Protein, total 02/15/2018 6.8  6.4 - 8.2 g/dL Final    Albumin 02/15/2018 3.4* 3.5 - 5.0 g/dL Final    Globulin 02/15/2018 3.4  2.0 - 4.0 g/dL Final    A-G Ratio 02/15/2018 1.0* 1.1 - 2.2   Final    ALCOHOL(ETHYL),SERUM 02/15/2018 <10  <10 MG/DL Final    Acetaminophen level 02/15/2018 <2* 10 - 30 ug/mL Final    Salicylate level 22/74/0959 <1.7* 2.8 - 20.0 MG/DL Final    Phenobarbital 02/15/2018 27.8  15.0 - 40.0 ug/mL Final    Ventricular Rate 02/15/2018 57  BPM Final    Atrial Rate 02/15/2018 57  BPM Final    P-R Interval 02/15/2018 130  ms Final    QRS Duration 02/15/2018 70  ms Final    Q-T Interval 02/15/2018 430  ms Final    QTC Calculation (Bezet) 02/15/2018 418  ms Final    Calculated P Axis 02/15/2018 49  degrees Final    Calculated R Axis 02/15/2018 70  degrees Final    Calculated T Axis 02/15/2018 54  degrees Final    Diagnosis 02/15/2018    Final                    Value:Sinus bradycardia  When compared with ECG of 17-OCT-2016 20:59,  No significant change was found  Confirmed by Fidelina Parker M.D., Thamas Grist ([id number]) on 2/16/2018 12:26:14 PM      Pregnancy test,urine (POC) 02/16/2018 NEGATIVE   NEG   Final    AMPHETAMINES 02/16/2018 POSITIVE* NEG   Final    BARBITURATES 02/16/2018 POSITIVE* NEG   Final    BENZODIAZEPINES 02/16/2018 NEGATIVE   NEG   Final    COCAINE 02/16/2018 NEGATIVE   NEG   Final    METHADONE 02/16/2018 NEGATIVE   NEG   Final    OPIATES 02/16/2018 NEGATIVE   NEG   Final    PCP(PHENCYCLIDINE) 02/16/2018 NEGATIVE   NEG Final    THC (TH-CANNABINOL) 02/16/2018 POSITIVE* NEG   Final    Drug screen comment 02/16/2018 (NOTE)   Final    Phenobarbital 02/16/2018 24.9  15.0 - 40.0 ug/mL Final    TSH 02/16/2018 1.16  0.36 - 3.74 uIU/mL Final        RADIOLOGY REPORTS:    Results from Hospital Encounter encounter on 01/23/15   XR CHEST PORT   Narrative **Final Report**      ICD Codes / Adm. Diagnosis: 839895   / Dizziness  Dizziness  Examination:  CR CHEST PORT  - [id number] - Jan 23 2015  4:57PM  Accession No:  [id number]  Reason:  Dizziness      REPORT:  EXAM:  CR CHEST PORT    INDICATION:  Dizziness    COMPARISON:  None. FINDINGS: A portable AP radiograph of the chest was obtained at 1651 hours. The heart size is normal. The lungs are clear. IMPRESSION:  1. No acute process           Signing/Reading Doctor: Carol Garcia ([id number])    Approved: Carol Garcia ([id number])  Jan 23 2015  5:02PM                                 Ct Head Wo Cont    Result Date: 2/16/2018  INDICATION:   AMS EXAM:  HEAD CT WITHOUT CONTRAST COMPARISON:  January 24, 2015 TECHNIQUE:  Routine noncontrast axial head CT was performed. Sagittal and coronal reconstructions were generated. CT dose reduction was achieved through use of a standardized protocol tailored for this examination and automatic exposure control for dose modulation. Adaptive statistical iterative reconstruction (ASIR) was utilized. FINDINGS: Ventricles:  Midline, no hydrocephalus. Intracranial Hemorrhage:  None. Brain Parenchyma/Brainstem:  Normal for age. Basal Cisterns:  Normal. Paranasal Sinuses:  Visualized sinuses are clear. Additional Comments:  N/A. IMPRESSION: No acute process.               MEDICATIONS       ALL MEDICATIONS  Current Facility-Administered Medications   Medication Dose Route Frequency    acetaminophen (TYLENOL) tablet 650 mg  650 mg Oral Q4H PRN    docusate sodium (COLACE) capsule 100 mg  100 mg Oral BID    [START ON 2/18/2018] fludrocortisone (FLORINEF) tablet 200 mcg  0.2 mg Oral DAILY    [START ON 2/18/2018] levothyroxine (SYNTHROID) tablet 62.5 mcg  62.5 mcg Oral 6am    [START ON 2/18/2018] sertraline (ZOLOFT) tablet 25 mg  25 mg Oral DAILY    ziprasidone (GEODON) 20 mg in sterile water (preservative free) 1 mL injection  20 mg IntraMUSCular BID PRN    OLANZapine (ZyPREXA) tablet 5 mg  5 mg Oral Q6H PRN    benztropine (COGENTIN) tablet 2 mg  2 mg Oral BID PRN    benztropine (COGENTIN) injection 2 mg  2 mg IntraMUSCular BID PRN    LORazepam (ATIVAN) injection 2 mg  2 mg IntraMUSCular Q4H PRN    LORazepam (ATIVAN) tablet 1 mg  1 mg Oral Q4H PRN    acetaminophen (TYLENOL) tablet 650 mg  650 mg Oral Q4H PRN    ibuprofen (MOTRIN) tablet 400 mg  400 mg Oral Q8H PRN    magnesium hydroxide (MILK OF MAGNESIA) 400 mg/5 mL oral suspension 30 mL  30 mL Oral DAILY PRN    nicotine (NICODERM CQ) 21 mg/24 hr patch 1 Patch  1 Patch TransDERmal DAILY PRN    zolpidem (AMBIEN) tablet 5 mg  5 mg Oral QHS PRN      SCHEDULED MEDICATIONS  Current Facility-Administered Medications   Medication Dose Route Frequency    docusate sodium (COLACE) capsule 100 mg  100 mg Oral BID    [START ON 2/18/2018] fludrocortisone (FLORINEF) tablet 200 mcg  0.2 mg Oral DAILY    [START ON 2/18/2018] levothyroxine (SYNTHROID) tablet 62.5 mcg  62.5 mcg Oral 6am    [START ON 2/18/2018] sertraline (ZOLOFT) tablet 25 mg  25 mg Oral DAILY                ASSESSMENT & PLAN        The patient Marco Victoria is a 29 y.o.  female who presents at this time for treatment of the following diagnoses:  Patient Active Hospital Problem List:   Depressive disorder (2/17/2018)    Assessment: depression and suicidal attempt     Plan: continue same medications           Disposition:inpatient psychiatry   Thank you very much for the opportunity to participate in the care of your patient. I will continue to follow up with patient as deemed appropriate.        I have reviewed admission (and previous/old) labs and medical tests in the EHR and or transferring hospital documents. I will continue to order blood tests/labs and diagnostic tests as deemed appropriate and review results as they become available (see orders for details). I have reviewed old psychiatric and medical records available in the EHR. I Will order additional psychiatric records from other institutions to further elucidate the nature of patient's psychopathology and review once available. I will gather additional collateral information from friends, family and o/p treatment team to further elucidate the nature of patient's psychopathology and baselline level of psychiatric functioning.                      SIGNED:    Bhaskar Aguiar MD  2/17/2018

## 2018-02-18 NOTE — BH NOTES
Pt attended reflections group. Discussed some unit rules and procedures, with discussion, and answered questions. Pt attentive and interactive; pt seemed to understand.

## 2018-02-18 NOTE — PROGRESS NOTES
Patient's mews is 2 due to BP of 94/63. Patient advised her BP is generally low. Patient is being encouraged to drink plenty of fluids. Physician is aware.

## 2018-02-19 VITALS
TEMPERATURE: 97.8 F | RESPIRATION RATE: 16 BRPM | OXYGEN SATURATION: 100 % | DIASTOLIC BLOOD PRESSURE: 74 MMHG | SYSTOLIC BLOOD PRESSURE: 109 MMHG | HEART RATE: 66 BPM

## 2018-02-19 PROCEDURE — 74011250637 HC RX REV CODE- 250/637: Performed by: INTERNAL MEDICINE

## 2018-02-19 RX ADMIN — DOCUSATE SODIUM 100 MG: 100 CAPSULE, LIQUID FILLED ORAL at 08:39

## 2018-02-19 RX ADMIN — SERTRALINE HYDROCHLORIDE 25 MG: 50 TABLET ORAL at 08:39

## 2018-02-19 RX ADMIN — FLUDROCORTISONE ACETATE 200 MCG: 0.1 TABLET ORAL at 08:39

## 2018-02-19 RX ADMIN — LEVOTHYROXINE SODIUM 62.5 MCG: 25 TABLET ORAL at 06:48

## 2018-02-19 NOTE — BH NOTES
Behavioral Health Transition Record to Provider    Patient Name: Moishe Cockayne  YOB: 1985  Medical Record Number: [id number]  Date of Admission: 2/15/2018  Date of Discharge: 2/19/2018    Attending Provider: Kapil Olson MD  Discharging Provider: Kapil Olson MD  To contact this individual call [phone] and ask the  to page. If unavailable, ask to be transferred to [facility] Provider on call. [facility] Provider will be available on call 24/7 and during holidays. Primary Care Provider: Mark Brizuela DO    No Known Allergies    Reason for Admission: Patient was admitted under a voluntary basis for depressed mood and intentional overdose. Admission Diagnosis: Major depression    * No surgery found *    Results for orders placed or performed during the hospital encounter of 02/15/18   URINE CULTURE HOLD SAMPLE   Result Value Ref Range    Urine culture hold        URINE ON HOLD IN MICROBIOLOGY DEPT FOR 3 DAYS. IF UNPRESERVED URINE IS SUBMITTED, IT CANNOT BE USED FOR ADDITIONAL TESTING AFTER 24 HRS, RECOLLECTION WILL BE REQUIRED.    URINALYSIS W/MICROSCOPIC   Result Value Ref Range    Color YELLOW/STRAW      Appearance CLEAR CLEAR      Specific gravity 1.024 1.003 - 1.030      pH (UA) 6.5 5.0 - 8.0      Protein NEGATIVE  NEG mg/dL    Glucose NEGATIVE  NEG mg/dL    Ketone TRACE (A) NEG mg/dL    Bilirubin NEGATIVE  NEG      Blood NEGATIVE  NEG      Urobilinogen 0.2 0.2 - 1.0 EU/dL    Nitrites NEGATIVE  NEG      Leukocyte Esterase NEGATIVE  NEG      WBC 0-4 0 - 4 /hpf    RBC 0-5 0 - 5 /hpf    Epithelial cells FEW FEW /lpf    Bacteria NEGATIVE  NEG /hpf    Hyaline cast 0-2 0 - 5 /lpf   CBC WITH AUTOMATED DIFF   Result Value Ref Range    WBC 5.4 3.6 - 11.0 K/uL    RBC 4.34 3.80 - 5.20 M/uL    HGB 12.9 11.5 - 16.0 g/dL    HCT 39.0 35.0 - 47.0 %    MCV 89.9 80.0 - 99.0 FL    MCH 29.7 26.0 - 34.0 PG    MCHC 33.1 30.0 - 36.5 g/dL    RDW 13.4 11.5 - 14.5 % PLATELET 779 439 - 722 K/uL    MPV 12.1 8.9 - 12.9 FL    NRBC 0.0 0  WBC    ABSOLUTE NRBC 0.00 0.00 - 0.01 K/uL    NEUTROPHILS 51 32 - 75 %    LYMPHOCYTES 37 12 - 49 %    MONOCYTES 8 5 - 13 %    EOSINOPHILS 4 0 - 7 %    BASOPHILS 0 0 - 1 %    IMMATURE GRANULOCYTES 0 0.0 - 0.5 %    ABS. NEUTROPHILS 2.8 1.8 - 8.0 K/UL    ABS. LYMPHOCYTES 2.0 0.8 - 3.5 K/UL    ABS. MONOCYTES 0.5 0.0 - 1.0 K/UL    ABS. EOSINOPHILS 0.2 0.0 - 0.4 K/UL    ABS. BASOPHILS 0.0 0.0 - 0.1 K/UL    ABS. IMM. GRANS. 0.0 0.00 - 0.04 K/UL    DF AUTOMATED     METABOLIC PANEL, COMPREHENSIVE   Result Value Ref Range    Sodium 140 136 - 145 mmol/L    Potassium 3.6 3.5 - 5.1 mmol/L    Chloride 104 97 - 108 mmol/L    CO2 27 21 - 32 mmol/L    Anion gap 9 5 - 15 mmol/L    Glucose 68 65 - 100 mg/dL    BUN 14 6 - 20 MG/DL    Creatinine 0.64 0.55 - 1.02 MG/DL    BUN/Creatinine ratio 22 (H) 12 - 20      GFR est AA >60 >60 ml/min/1.73m2    GFR est non-AA >60 >60 ml/min/1.73m2    Calcium 8.3 (L) 8.5 - 10.1 MG/DL    Bilirubin, total 0.7 0.2 - 1.0 MG/DL    ALT (SGPT) 18 12 - 78 U/L    AST (SGOT) 8 (L) 15 - 37 U/L    Alk.  phosphatase 27 (L) 45 - 117 U/L    Protein, total 6.8 6.4 - 8.2 g/dL    Albumin 3.4 (L) 3.5 - 5.0 g/dL    Globulin 3.4 2.0 - 4.0 g/dL    A-G Ratio 1.0 (L) 1.1 - 2.2     ETHYL ALCOHOL   Result Value Ref Range    ALCOHOL(ETHYL),SERUM <10 <10 MG/DL   ACETAMINOPHEN   Result Value Ref Range    Acetaminophen level <2 (L) 10 - 30 ug/mL   SALICYLATE   Result Value Ref Range    Salicylate level <1.3 (L) 2.8 - 20.0 MG/DL   PHENOBARBITAL   Result Value Ref Range    Phenobarbital 27.8 15.0 - 40.0 ug/mL   DRUG SCREEN, URINE   Result Value Ref Range    AMPHETAMINES POSITIVE (A) NEG      BARBITURATES POSITIVE (A) NEG      BENZODIAZEPINES NEGATIVE  NEG      COCAINE NEGATIVE  NEG      METHADONE NEGATIVE  NEG      OPIATES NEGATIVE  NEG      PCP(PHENCYCLIDINE) NEGATIVE  NEG      THC (TH-CANNABINOL) POSITIVE (A) NEG      Drug screen comment (NOTE) PHENOBARBITAL   Result Value Ref Range    Phenobarbital 24.9 15.0 - 40.0 ug/mL   TSH 3RD GENERATION   Result Value Ref Range    TSH 1.16 0.36 - 3.74 uIU/mL   HCG URINE, QL. - POC   Result Value Ref Range    Pregnancy test,urine (POC) NEGATIVE  NEG     EKG, 12 LEAD, INITIAL   Result Value Ref Range    Ventricular Rate 57 BPM    Atrial Rate 57 BPM    P-R Interval 130 ms    QRS Duration 70 ms    Q-T Interval 430 ms    QTC Calculation (Bezet) 418 ms    Calculated P Axis 49 degrees    Calculated R Axis 70 degrees    Calculated T Axis 54 degrees    Diagnosis       Sinus bradycardia  When compared with ECG of 17-OCT-2016 20:59,  No significant change was found  Confirmed by Julian Ingram M.D., Ed Miller ([id number]) on 2/16/2018 12:26:14 PM         Immunizations administered during this encounter: There is no immunization history on file for this patient. Screening for Metabolic Disorders for Patients on Antipsychotic Medications  (Data obtained from the EMR)    Estimated Body Mass Index  Estimated body mass index is 22.55 kg/(m^2) as calculated from the following:    Height as of this encounter: 5' 7\" (1.702 m). Weight as of this encounter: 65.3 kg (144 lb).      Vital Signs/Blood Pressure  Visit Vitals    BP (!) 88/53    Pulse 85    Temp 98.1 °F (36.7 °C)    Resp 16    Ht 5' 7\" (1.702 m)    Wt 65.3 kg (144 lb)    LMP 01/25/2018    SpO2 100%    Breastfeeding No    BMI 22.55 kg/m2       Blood Glucose/Hemoglobin A1c  Lab Results   Component Value Date/Time    Glucose 124 (H) 02/17/2018 02:34 AM       No results found for: HBA1C, HGBE8, XFY0LBOH     Lipid Panel  Lab Results   Component Value Date/Time    Cholesterol, total 153 02/17/2018 02:34 AM    HDL Cholesterol 62 02/17/2018 02:34 AM    LDL, calculated 67.2 02/17/2018 02:34 AM    Triglyceride 119 02/17/2018 02:34 AM    CHOL/HDL Ratio 2.5 02/17/2018 02:34 AM        Discharge Diagnosis: Major depression (ICD-10-CM: F32.9)    Discharge Plan: Patient discharged into the care of family. DISCHARGE SUMMARY    Dontrell Patel  : 1985  MRN: [id number]    The patient Dan Simmonds exhibits the ability to control behavior in a less restrictive environment. Patient's level of functioning is improving. No assaultive/destructive behavior has been observed for the past 24 hours. No suicidal/homicidal threat or behavior has been observed for the past 24 hours. There is no evidence of serious medication side effects. Patient has not been in physical or protective restraints for at least the past 24 hours. If weapons involved, how are they secured? No weapons involved. Is patient aware of and in agreement with discharge plan? Yes    Arrangements for medication:  No prescriptions written. Referral for substance abuse treatment? Yes, Dr. Jordy Osorio    Referral for smoking cessation needed? Patient is not a smoker/Not applicable. Copy of discharge instructions to provider?:  Dr. Jordy Osorio ([phone]); Anoop Brown ([phone])    Arrangements for transportation home:  Family to . Keep all follow up appointments as scheduled, continue to take prescribed medications per physician instructions. Mental health crisis number:  431 or your local mental health crisis line number at [phone]. Discharge Medication List and Instructions:   Discharge Medication List as of 2018  1:37 AM          Unresulted Labs     None        To obtain results of studies pending at discharge, please contact [phone]. Follow-up Information     Follow up With Details Comments [address] [address]  [phone]            Advanced Directive:   Does the patient have an appointed surrogate decision maker? No  Does the patient have a Medical Advance Directive? No  Does the patient have a Psychiatric Advance Directive?  No  If the patient does not have a surrogate or Medical Advance Directive AND Psychiatric Advance Directive, the patient was offered information on these advance directives Yes and Patient declined to complete    Patient Instructions: Please continue all medications until otherwise directed by physician. Tobacco Cessation Discharge Plan:   Is the patient a smoker and needs referral for smoking cessation? No  Patient referred to the following for smoking cessation with an appointment? Not applicable     Patient was offered medication to assist with smoking cessation at discharge? Not applicable  Was education for smoking cessation added to the discharge instructions? Not applicable    Alcohol/Substance Abuse Discharge Plan:   Does the patient have a history of substance/alcohol abuse and requires a referral for treatment? Yes  Patient referred to the following for substance/alcohol abuse treatment with an appointment? Yes, Patient has an appointment with Dr. Pankaj Ko on 2/23/18 at 9:00am.  Patient was offered medication to assist with alcohol cessation at discharge? No  Was education for substance/alcohol abuse added to discharge instructions? Yes    Patient discharged to Home; discussed with patient/caregiver and provided to the patient/caregiver either in hard copy or electronically.

## 2018-02-19 NOTE — DISCHARGE INSTRUCTIONS
DISCHARGE SUMMARY    Tip Patel  : 1985  MRN: [id number]    The patient Pardeep Vazquez exhibits the ability to control behavior in a less restrictive environment. Patient's level of functioning is improving. No assaultive/destructive behavior has been observed for the past 24 hours. No suicidal/homicidal threat or behavior has been observed for the past 24 hours. There is no evidence of serious medication side effects. Patient has not been in physical or protective restraints for at least the past 24 hours. If weapons involved, how are they secured? No weapons involved. Is patient aware of and in agreement with discharge plan? Yes    Arrangements for medication:  No prescriptions written. Referral for substance abuse treatment? Yes, Dr. Femi Paulson    Referral for smoking cessation needed? Patient is not a smoker/Not applicable. Copy of discharge instructions to provider?:  Dr. Femi Paulson ([phone]); Irasema Delgado ([phone])    Arrangements for transportation home:  Family to . Keep all follow up appointments as scheduled, continue to take prescribed medications per physician instructions. Mental health crisis number:  115 or your local mental health crisis line number at [phone].

## 2018-02-19 NOTE — PROGRESS NOTES
Problem: Depressed Mood (Adult/Pediatric)  Goal: *STG: Participates in treatment plan  Outcome: Progressing Towards Goal  Patient being discharged today at approx. 3 pm. Going home with family.  Denies SI.   Med and meal compliant

## 2018-02-19 NOTE — BH NOTES
GROUP THERAPY PROGRESS NOTE    Elbert Velasquez did not participate in the General Unit's 65 minute Process Group, with a focus identifying feelings and planning for the day.

## 2018-02-19 NOTE — BH NOTES
Behavioral Health Interdisciplinary Rounds     Patient Name: Beatriz Catalan  Age: 29 y.o.   Room/Bed:  727/  Primary Diagnosis: <principal problem not specified>   Admission Status: Voluntary     Readmission within 30 days:   Power of  in place:   Patient requires a blocked bed: no          Reason for blocked bed: na    VTE Prophylaxis:   Flu vaccine given :    Mobility needs/Fall risk: no    Nutritional Plan:   Consults:          Labs/Testing due today?: no    Sleep hours:  7+      Participation in Care/Groups:  yes  Medication Compliant?: Yes  PRNS (last 24 hours): None    Restraints (last 24 hours):  no  Substance Abuse:   CIWA (range last 24 hours):  COWS (range last 24 hours):   Alcohol screening (AUDIT) completed -  AUDIT Score: 0  If applicable, date SBIRT discussed in treatment team AND documented:   Tobacco - patient is a smoker: no   Date tobacco education completed by RN: liliya  24 hour chart check complete: yes     Patient goal(s) for today:   Treatment team focus/goals:   Progress note     LOS:  2  Expected LOS: TBD    Financial concerns/prescription coverage:    Date of last family contact:       Family requesting physician contact today:    Discharge plan: TBD  Guns in the home:         Outpatient provider(s):     Participating treatment team members: Beatriz Catalan, * (assigned SW),

## 2018-02-19 NOTE — DISCHARGE SUMMARY
PSYCHIATRIC DISCHARGE SUMMARY         IDENTIFICATION:    Patient Name  Chiquis Solorzano   Date of Birth 1985   [facility] [id number]   Medical Record Number  [id number]      Age  29 y.o. PCP Shawn Colon DO   Admit date:  2/17/2018    Discharge date: 2/19/2018   Room Number  727/02  @ [facility]   Date of Service  2/19/2018               TYPE OF DISCHARGE: REGULAR               CONDITION AT DISCHARGE: good       PROVISIONAL & DISCHARGE DIAGNOSES:    Problem List  Date Reviewed: 2/17/2018          Codes Class    Depressive disorder ICD-10-CM: F32.9  ICD-9-CM: 311         Major depression ICD-10-CM: F32.9  ICD-9-CM: 296.20         Orthostatic hypotension ICD-10-CM: I95.1  ICD-9-CM: 458.0         Shuffling gait ICD-10-CM: R26.89  ICD-9-CM: 781.2     Overview Signed 10/18/2016 11:08 AM by Eben Maurice NP     --intermittent when head was shaking/bobbing              Migraine with aura, without mention of intractable migraine without mention of status migrainosus ICD-10-CM: G43.109  ICD-9-CM: 346.00         Memory loss ICD-10-CM: R41.3  ICD-9-CM: 780.93         Ataxia ICD-10-CM: R27.0  ICD-9-CM: 204. 3         Sleep apnea (Chronic) ICD-10-CM: G47.30  ICD-9-CM: 780.57         ADHD (attention deficit hyperactivity disorder) (Chronic) ICD-10-CM: F90.9  ICD-9-CM: 314.01         B12 deficiency ICD-10-CM: E53.8  ICD-9-CM: 266.2         Osteoporosis ICD-10-CM: M81.0  ICD-9-CM: 733.00         Dizziness ICD-10-CM: R42  ICD-9-CM: 780.4               Active Hospital Problems    Depressive disorder        DISCHARGE DIAGNOSIS:   Axis I:  SEE ABOVE  Axis II: SEE ABOVE  Axis III: SEE ABOVE  Axis IV:  lack of structure  Axis V:  60 on admission, 70 on discharge 70(baseline)       CC & HISTORY OF PRESENT ILLNESS:29year old female admitted voluntarily for depressed mood. Pt. Was restarted on home meds and improved. At discharge, she denied SI/HI intent and plan and did not meet TDO criteria.      SOCIAL HISTORY: Social History     Social History    Marital status: SINGLE     Spouse name: N/A    Number of children: N/A    Years of education: N/A     Occupational History    Not on file. Social History Main Topics    Smoking status: Never Smoker    Smokeless tobacco: Never Used    Alcohol use Yes      Comment: rarely    Drug use: No    Sexual activity: Not on file     Other Topics Concern    Not on file     Social History Narrative      FAMILY HISTORY:   Family History   Problem Relation Age of Onset    Heart Disease Maternal Grandfather     Cancer Paternal Grandmother     Heart Disease Paternal Grandmother              HOSPITALIZATION COURSE:    Thelma Urena was admitted to the inpatient psychiatric unit [facility] the [facility] for acute psychiatric stabilization in regards to symptomatology as described in the HPI above. The differential diagnosis at time of admission included: depressed mood   While on the unit Thelma Urena was involved in individual, group, occupational and milieu therapy. Psychiatric medications were adjusted during this hospitalization including ambian. Thelma Urena demonstrated a slow, but progressive improvement in overall condition. Much of patient's depression appeared to be related to situational stressors and psychological factors. Please see individual progress notes for more specific details regarding patient's hospitalization course. At time of dc, Thelma Urena was without significant problems with depression psychosis  willard. Overall presentation at time of discharge is most consistent with the diagnosis of adjustment disorder with depressed mood. Patient with request for discharge today. There are no grounds to seek a TDO. Patient has maximized benefit to be derived from acute inpatient psychiatric treatment.   All members of the treatment team concur with each other in regards to plans for discharge today per patient's request.          Jonnie Salas IMAGAING:    Labs Reviewed - No data to display  Admission on 02/17/2018, Discharged on 02/17/2018   Component Date Value Ref Range Status    Sodium 02/17/2018 141  136 - 145 mmol/L Final    Potassium 02/17/2018 3.7  3.5 - 5.1 mmol/L Final    Chloride 02/17/2018 106  97 - 108 mmol/L Final    CO2 02/17/2018 29  21 - 32 mmol/L Final    Anion gap 02/17/2018 6  5 - 15 mmol/L Final    Glucose 02/17/2018 124* 65 - 100 mg/dL Final    BUN 02/17/2018 12  6 - 20 MG/DL Final    Creatinine 02/17/2018 0.69  0.55 - 1.02 MG/DL Final    BUN/Creatinine ratio 02/17/2018 17  12 - 20   Final    GFR est AA 02/17/2018 >60  >60 ml/min/1.73m2 Final    GFR est non-AA 02/17/2018 >60  >60 ml/min/1.73m2 Final    Calcium 02/17/2018 8.0* 8.5 - 10.1 MG/DL Final    Bilirubin, total 02/17/2018 0.4  0.2 - 1.0 MG/DL Final    ALT (SGPT) 02/17/2018 17  12 - 78 U/L Final    AST (SGOT) 02/17/2018 11* 15 - 37 U/L Final    Alk.  phosphatase 02/17/2018 31* 45 - 117 U/L Final    Protein, total 02/17/2018 5.9* 6.4 - 8.2 g/dL Final    Albumin 02/17/2018 2.9* 3.5 - 5.0 g/dL Final    Globulin 02/17/2018 3.0  2.0 - 4.0 g/dL Final    A-G Ratio 02/17/2018 1.0* 1.1 - 2.2   Final    LIPID PROFILE 02/17/2018        Final    Cholesterol, total 02/17/2018 153  <200 MG/DL Final    Triglyceride 02/17/2018 119  <150 MG/DL Final    HDL Cholesterol 02/17/2018 62  MG/DL Final    LDL, calculated 02/17/2018 67.2  0 - 100 MG/DL Final    VLDL, calculated 02/17/2018 23.8  MG/DL Final    CHOL/HDL Ratio 02/17/2018 2.5  0 - 5.0   Final    WBC 02/17/2018 4.8  3.6 - 11.0 K/uL Final    RBC 02/17/2018 4.12  3.80 - 5.20 M/uL Final    HGB 02/17/2018 12.4  11.5 - 16.0 g/dL Final    HCT 02/17/2018 36.5  35.0 - 47.0 % Final    MCV 02/17/2018 88.6  80.0 - 99.0 FL Final    MCH 02/17/2018 30.1  26.0 - 34.0 PG Final    MCHC 02/17/2018 34.0  30.0 - 36.5 g/dL Final    RDW 02/17/2018 13.4  11.5 - 14.5 % Final    PLATELET 02/32/1018 730* 150 - 400 K/uL Final    MPV 02/17/2018 11.9  8.9 - 12.9 FL Final    NRBC 02/17/2018 0.0  0  WBC Final    ABSOLUTE NRBC 02/17/2018 0.00  0.00 - 0.01 K/uL Final    NEUTROPHILS 02/17/2018 46  32 - 75 % Final    LYMPHOCYTES 02/17/2018 41  12 - 49 % Final    MONOCYTES 02/17/2018 9  5 - 13 % Final    EOSINOPHILS 02/17/2018 4  0 - 7 % Final    BASOPHILS 02/17/2018 0  0 - 1 % Final    IMMATURE GRANULOCYTES 02/17/2018 0  0.0 - 0.5 % Final    ABS. NEUTROPHILS 02/17/2018 2.2  1.8 - 8.0 K/UL Final    ABS. LYMPHOCYTES 02/17/2018 2.0  0.8 - 3.5 K/UL Final    ABS. MONOCYTES 02/17/2018 0.4  0.0 - 1.0 K/UL Final    ABS. EOSINOPHILS 02/17/2018 0.2  0.0 - 0.4 K/UL Final    ABS. BASOPHILS 02/17/2018 0.0  0.0 - 0.1 K/UL Final    ABS. IMM. GRANS. 02/17/2018 0.0  0.00 - 0.04 K/UL Final    DF 02/17/2018 AUTOMATED    Final    Magnesium 02/17/2018 2.1  1.6 - 2.4 mg/dL Final    Phosphorus 02/17/2018 4.0  2.6 - 4.7 MG/DL Final   Admission on 02/15/2018, Discharged on 02/17/2018   Component Date Value Ref Range Status    Color 02/16/2018 YELLOW/STRAW    Final    Appearance 02/16/2018 CLEAR  CLEAR   Final    Specific gravity 02/16/2018 1.024  1.003 - 1.030   Final    pH (UA) 02/16/2018 6.5  5.0 - 8.0   Final    Protein 02/16/2018 NEGATIVE   NEG mg/dL Final    Glucose 02/16/2018 NEGATIVE   NEG mg/dL Final    Ketone 02/16/2018 TRACE* NEG mg/dL Final    Bilirubin 02/16/2018 NEGATIVE   NEG   Final    Blood 02/16/2018 NEGATIVE   NEG   Final    Urobilinogen 02/16/2018 0.2  0.2 - 1.0 EU/dL Final    Nitrites 02/16/2018 NEGATIVE   NEG   Final    Leukocyte Esterase 02/16/2018 NEGATIVE   NEG   Final    WBC 02/16/2018 0-4  0 - 4 /hpf Final    RBC 02/16/2018 0-5  0 - 5 /hpf Final    Epithelial cells 02/16/2018 FEW  FEW /lpf Final    Bacteria 02/16/2018 NEGATIVE   NEG /hpf Final    Hyaline cast 02/16/2018 0-2  0 - 5 /lpf Final    Urine culture hold 02/16/2018 URINE ON HOLD IN MICROBIOLOGY DEPT FOR 3 DAYS.  IF UNPRESERVED URINE IS SUBMITTED, IT CANNOT BE USED FOR ADDITIONAL TESTING AFTER 24 HRS, RECOLLECTION WILL BE REQUIRED. Final    WBC 02/15/2018 5.4  3.6 - 11.0 K/uL Final    RBC 02/15/2018 4.34  3.80 - 5.20 M/uL Final    HGB 02/15/2018 12.9  11.5 - 16.0 g/dL Final    HCT 02/15/2018 39.0  35.0 - 47.0 % Final    MCV 02/15/2018 89.9  80.0 - 99.0 FL Final    MCH 02/15/2018 29.7  26.0 - 34.0 PG Final    MCHC 02/15/2018 33.1  30.0 - 36.5 g/dL Final    RDW 02/15/2018 13.4  11.5 - 14.5 % Final    PLATELET 38/60/5808 122  150 - 400 K/uL Final    MPV 02/15/2018 12.1  8.9 - 12.9 FL Final    NRBC 02/15/2018 0.0  0  WBC Final    ABSOLUTE NRBC 02/15/2018 0.00  0.00 - 0.01 K/uL Final    NEUTROPHILS 02/15/2018 51  32 - 75 % Final    LYMPHOCYTES 02/15/2018 37  12 - 49 % Final    MONOCYTES 02/15/2018 8  5 - 13 % Final    EOSINOPHILS 02/15/2018 4  0 - 7 % Final    BASOPHILS 02/15/2018 0  0 - 1 % Final    IMMATURE GRANULOCYTES 02/15/2018 0  0.0 - 0.5 % Final    ABS. NEUTROPHILS 02/15/2018 2.8  1.8 - 8.0 K/UL Final    ABS. LYMPHOCYTES 02/15/2018 2.0  0.8 - 3.5 K/UL Final    ABS. MONOCYTES 02/15/2018 0.5  0.0 - 1.0 K/UL Final    ABS. EOSINOPHILS 02/15/2018 0.2  0.0 - 0.4 K/UL Final    ABS. BASOPHILS 02/15/2018 0.0  0.0 - 0.1 K/UL Final    ABS. IMM.  GRANS. 02/15/2018 0.0  0.00 - 0.04 K/UL Final    DF 02/15/2018 AUTOMATED    Final    Sodium 02/15/2018 140  136 - 145 mmol/L Final    Potassium 02/15/2018 3.6  3.5 - 5.1 mmol/L Final    Chloride 02/15/2018 104  97 - 108 mmol/L Final    CO2 02/15/2018 27  21 - 32 mmol/L Final    Anion gap 02/15/2018 9  5 - 15 mmol/L Final    Glucose 02/15/2018 68  65 - 100 mg/dL Final    BUN 02/15/2018 14  6 - 20 MG/DL Final    Creatinine 02/15/2018 0.64  0.55 - 1.02 MG/DL Final    BUN/Creatinine ratio 02/15/2018 22* 12 - 20   Final    GFR est AA 02/15/2018 >60  >60 ml/min/1.73m2 Final    GFR est non-AA 02/15/2018 >60  >60 ml/min/1.73m2 Final    Calcium 02/15/2018 8.3* 8.5 - 10.1 MG/DL Final    Bilirubin, total 02/15/2018 0.7  0.2 - 1.0 MG/DL Final    ALT (SGPT) 02/15/2018 18  12 - 78 U/L Final    AST (SGOT) 02/15/2018 8* 15 - 37 U/L Final    Alk.  phosphatase 02/15/2018 27* 45 - 117 U/L Final    Protein, total 02/15/2018 6.8  6.4 - 8.2 g/dL Final    Albumin 02/15/2018 3.4* 3.5 - 5.0 g/dL Final    Globulin 02/15/2018 3.4  2.0 - 4.0 g/dL Final    A-G Ratio 02/15/2018 1.0* 1.1 - 2.2   Final    ALCOHOL(ETHYL),SERUM 02/15/2018 <10  <10 MG/DL Final    Acetaminophen level 02/15/2018 <2* 10 - 30 ug/mL Final    Salicylate level 78/77/6865 <1.7* 2.8 - 20.0 MG/DL Final    Phenobarbital 02/15/2018 27.8  15.0 - 40.0 ug/mL Final    Ventricular Rate 02/15/2018 57  BPM Final    Atrial Rate 02/15/2018 57  BPM Final    P-R Interval 02/15/2018 130  ms Final    QRS Duration 02/15/2018 70  ms Final    Q-T Interval 02/15/2018 430  ms Final    QTC Calculation (Bezet) 02/15/2018 418  ms Final    Calculated P Axis 02/15/2018 49  degrees Final    Calculated R Axis 02/15/2018 70  degrees Final    Calculated T Axis 02/15/2018 54  degrees Final    Diagnosis 02/15/2018    Final                    Value:Sinus bradycardia  When compared with ECG of 17-OCT-2016 20:59,  No significant change was found  Confirmed by Gabriela Peck M.D., Cami Muse ([id number]) on 2/16/2018 12:26:14 PM      Pregnancy test,urine (POC) 02/16/2018 NEGATIVE   NEG   Final    AMPHETAMINES 02/16/2018 POSITIVE* NEG   Final    BARBITURATES 02/16/2018 POSITIVE* NEG   Final    BENZODIAZEPINES 02/16/2018 NEGATIVE   NEG   Final    COCAINE 02/16/2018 NEGATIVE   NEG   Final    METHADONE 02/16/2018 NEGATIVE   NEG   Final    OPIATES 02/16/2018 NEGATIVE   NEG   Final    PCP(PHENCYCLIDINE) 02/16/2018 NEGATIVE   NEG   Final    THC (TH-CANNABINOL) 02/16/2018 POSITIVE* NEG   Final    Drug screen comment 02/16/2018 (NOTE)   Final    Phenobarbital 02/16/2018 24.9  15.0 - 40.0 ug/mL Final    TSH 02/16/2018 1.16  0.36 - 3.74 uIU/mL Final     Ct Head Wo Cont    Result Date: 2/16/2018  INDICATION:   AMS EXAM:  HEAD CT WITHOUT CONTRAST COMPARISON:  January 24, 2015 TECHNIQUE:  Routine noncontrast axial head CT was performed. Sagittal and coronal reconstructions were generated. CT dose reduction was achieved through use of a standardized protocol tailored for this examination and automatic exposure control for dose modulation. Adaptive statistical iterative reconstruction (ASIR) was utilized. FINDINGS: Ventricles:  Midline, no hydrocephalus. Intracranial Hemorrhage:  None. Brain Parenchyma/Brainstem:  Normal for age. Basal Cisterns:  Normal. Paranasal Sinuses:  Visualized sinuses are clear. Additional Comments:  N/A. IMPRESSION: No acute process. DISPOSITION:    Home. Patient to f/u with o/p psychiatric, and psychotherapy appointments. Patient is to f/u with internist as directed. FOLLOW-UP CARE:    Activity as tolerated  Regular Diet  Wound Care: none needed. Follow-up Information     Follow up With Details Comments [address], DO   [address] [facility]  [phone]                   PROGNOSIS:  Greatly dependent upon patient's ability to f/u with o/p drug/etoh rehabilitation and psychiatric/psychotherapy appointments as well as to comply with psychiatric medications as prescribed. Patient denies suicidal or homicidal ideations. [place] [id number] fully contracts for safety. Patient reports many positive predictive factors in terms of not attempting suicide or homicide. Patient appears to be at low risk of suicide or homicide. Patient and family are aware and in agreement with discharge and discharge plan. DISCHARGE MEDICATIONS: no changes made).     Informed consent given for the use of following psychotropic medications:  Current Discharge Medication List      CONTINUE these medications which have NOT CHANGED    Details   sertraline (ZOLOFT) 25 mg tablet Take 25 mg by mouth daily. fludrocortisone (FLORINEF) 0.1 mg tablet Take 2 Tabs by mouth daily. Qty: 180 Tab, Refills: 1    Comments: Please call to schedule a follow up appointment. Associated Diagnoses: Dizziness      SYNTHROID 125 mcg tablet TAKE 1/2  TABLET BY MOUTH DAILY  Refills: 6         STOP taking these medications       multivitamin (ONE A DAY) tablet Comments:   Reason for Stopping:         ASHWAGANDHA ROOT EXTRACT,BULK, Comments:   Reason for Stopping:         amphetamine-dextroamphetamine XR (ADDERALL XR) 20 mg XR capsule Comments:   Reason for Stopping:                      A coordinated, multidisplinary treatment team round was conducted with [facility] NO Patel---this is done daily here at [facility] . This team consists of the nurse, psychiatric unit pharmcist,  and writer. I have spent greater than 35 minutes on discharge work.     Signed:  Ailin Monaco MD  2/19/2018

## 2018-02-19 NOTE — BH NOTES
RN called Rx into pharmacy for Zoloft per MD request. To [facility] [place]. Patient aware. After calling the pharmacy and speaking with patient she does have this RX filled at home and does not need a new RX.

## 2018-02-20 NOTE — BH NOTES
Behavioral Health Transition Record to Provider    Patient Name: Moishe Cockayne  YOB: 1985  Medical Record Number: [id number]  Date of Admission: 2/17/2018  Date of Discharge: 2/19/2018    Attending Provider: Kapil Olson MD  Discharging Provider: Kapil Olson MD  To contact this individual call [phone] and ask the  to page. If unavailable, ask to be transferred to [facility] Provider on call. [facility] Provider will be available on call 24/7 and during holidays. Primary Care Provider: Mark Brizuela DO    No Known Allergies    Reason for Admission: Patient was admitted under a voluntary basis for depressed mood and intentional overdose. Admission Diagnosis: depressive d/o  Depressive disorder    * No surgery found *    Results for orders placed or performed during the hospital encounter of 12/71/03   METABOLIC PANEL, COMPREHENSIVE   Result Value Ref Range    Sodium 141 136 - 145 mmol/L    Potassium 3.7 3.5 - 5.1 mmol/L    Chloride 106 97 - 108 mmol/L    CO2 29 21 - 32 mmol/L    Anion gap 6 5 - 15 mmol/L    Glucose 124 (H) 65 - 100 mg/dL    BUN 12 6 - 20 MG/DL    Creatinine 0.69 0.55 - 1.02 MG/DL    BUN/Creatinine ratio 17 12 - 20      GFR est AA >60 >60 ml/min/1.73m2    GFR est non-AA >60 >60 ml/min/1.73m2    Calcium 8.0 (L) 8.5 - 10.1 MG/DL    Bilirubin, total 0.4 0.2 - 1.0 MG/DL    ALT (SGPT) 17 12 - 78 U/L    AST (SGOT) 11 (L) 15 - 37 U/L    Alk.  phosphatase 31 (L) 45 - 117 U/L    Protein, total 5.9 (L) 6.4 - 8.2 g/dL    Albumin 2.9 (L) 3.5 - 5.0 g/dL    Globulin 3.0 2.0 - 4.0 g/dL    A-G Ratio 1.0 (L) 1.1 - 2.2     LIPID PANEL   Result Value Ref Range    LIPID PROFILE          Cholesterol, total 153 <200 MG/DL    Triglyceride 119 <150 MG/DL    HDL Cholesterol 62 MG/DL    LDL, calculated 67.2 0 - 100 MG/DL    VLDL, calculated 23.8 MG/DL    CHOL/HDL Ratio 2.5 0 - 5.0     CBC WITH AUTOMATED DIFF   Result Value Ref Range    WBC 4.8 3.6 - 11.0 K/uL RBC 4.12 3.80 - 5.20 M/uL    HGB 12.4 11.5 - 16.0 g/dL    HCT 36.5 35.0 - 47.0 %    MCV 88.6 80.0 - 99.0 FL    MCH 30.1 26.0 - 34.0 PG    MCHC 34.0 30.0 - 36.5 g/dL    RDW 13.4 11.5 - 14.5 %    PLATELET 892 (L) 721 - 400 K/uL    MPV 11.9 8.9 - 12.9 FL    NRBC 0.0 0  WBC    ABSOLUTE NRBC 0.00 0.00 - 0.01 K/uL    NEUTROPHILS 46 32 - 75 %    LYMPHOCYTES 41 12 - 49 %    MONOCYTES 9 5 - 13 %    EOSINOPHILS 4 0 - 7 %    BASOPHILS 0 0 - 1 %    IMMATURE GRANULOCYTES 0 0.0 - 0.5 %    ABS. NEUTROPHILS 2.2 1.8 - 8.0 K/UL    ABS. LYMPHOCYTES 2.0 0.8 - 3.5 K/UL    ABS. MONOCYTES 0.4 0.0 - 1.0 K/UL    ABS. EOSINOPHILS 0.2 0.0 - 0.4 K/UL    ABS. BASOPHILS 0.0 0.0 - 0.1 K/UL    ABS. IMM. GRANS. 0.0 0.00 - 0.04 K/UL    DF AUTOMATED     MAGNESIUM   Result Value Ref Range    Magnesium 2.1 1.6 - 2.4 mg/dL   PHOSPHORUS   Result Value Ref Range    Phosphorus 4.0 2.6 - 4.7 MG/DL       Immunizations administered during this encounter: There is no immunization history on file for this patient. Screening for Metabolic Disorders for Patients on Antipsychotic Medications  (Data obtained from the EMR)    Estimated Body Mass Index  Estimated body mass index is 22.55 kg/(m^2) as calculated from the following:    Height as of 2/15/18: 5' 7\" (1.702 m). Weight as of 2/15/18: 65.3 kg (144 lb).      Vital Signs/Blood Pressure  Visit Vitals    /74    Pulse 66    Temp 97.8 °F (36.6 °C)    Resp 16    LMP 01/25/2018    SpO2 100%    Breastfeeding No       Blood Glucose/Hemoglobin A1c  Lab Results   Component Value Date/Time    Glucose 124 (H) 02/17/2018 02:34 AM       No results found for: HBA1C, HGBE8, [id number]     Lipid Panel  Lab Results   Component Value Date/Time    Cholesterol, total 153 02/17/2018 02:34 AM    HDL Cholesterol 62 02/17/2018 02:34 AM    LDL, calculated 67.2 02/17/2018 02:34 AM    Triglyceride 119 02/17/2018 02:34 AM    CHOL/HDL Ratio 2.5 02/17/2018 02:34 AM        Discharge Diagnosis: Depressive disorder (ICD-10-CM: F32.9)    Discharge Plan: Patient discharged into the care of family. DISCHARGE SUMMARY    Pippa Patel  : 1985  MRN: [id number]    The patient Leon Felix exhibits the ability to control behavior in a less restrictive environment. Patient's level of functioning is improving. No assaultive/destructive behavior has been observed for the past 24 hours. No suicidal/homicidal threat or behavior has been observed for the past 24 hours. There is no evidence of serious medication side effects. Patient has not been in physical or protective restraints for at least the past 24 hours. If weapons involved, how are they secured? No weapons involved. Is patient aware of and in agreement with discharge plan? Yes    Arrangements for medication:  No prescriptions written. Referral for substance abuse treatment? Yes, Dr. Dimple Mccracken    Referral for smoking cessation needed? Patient is not a smoker/Not applicable. Copy of discharge instructions to provider?:  Dr. Dimple Mccracken ([phone]); Wenceslao Mcghee ([phone])    Arrangements for transportation home:  Family to . Keep all follow up appointments as scheduled, continue to take prescribed medications per physician instructions. Mental health crisis number:  009 or your local mental health crisis line number at [phone]. Discharge Medication List and Instructions:   Discharge Medication List as of 2018  9:33 AM      CONTINUE these medications which have NOT CHANGED    Details   sertraline (ZOLOFT) 25 mg tablet Take 25 mg by mouth daily. , Historical Med      fludrocortisone (FLORINEF) 0.1 mg tablet Take 2 Tabs by mouth daily. , NormalPlease call to schedule a follow up appointment. Disp-180 Tab, R-1      SYNTHROID 125 mcg tablet TAKE 1/2  TABLET BY MOUTH DAILY, Historical Med, R-6, LISA         STOP taking these medications       multivitamin (ONE A DAY) tablet Comments:   Reason for Stopping:         ASHWAGANDHA ROOT EXTRACT,BULK, Comments:   Reason for Stopping:         amphetamine-dextroamphetamine XR (ADDERALL XR) 20 mg XR capsule Comments:   Reason for Stopping:               Unresulted Labs     None        To obtain results of studies pending at discharge, please contact [phone]. Follow-up Information     Follow up With Details Comments [address], [address]      Shira Mendez On 2/23/2018 You have a 9:00am appointment for therapy. [address] [place] [address] [address], [address] [facility]  [phone]    Rafiq Huber Schedule an appointment as soon as possible for a visit Please call to schedule an appointment to see the psychiatric nurse practitioner within 30 days for medication management. [facility]  [address]  [address]  [phone]          Advanced Directive:   Does the patient have an appointed surrogate decision maker? No  Does the patient have a Medical Advance Directive? No  Does the patient have a Psychiatric Advance Directive? No  If the patient does not have a surrogate or Medical Advance Directive AND Psychiatric Advance Directive, the patient was offered information on these advance directives Yes and Patient declined to complete    Patient Instructions: Please continue all medications until otherwise directed by physician. Tobacco Cessation Discharge Plan:   Is the patient a smoker and needs referral for smoking cessation? No  Patient referred to the following for smoking cessation with an appointment? Not applicable     Patient was offered medication to assist with smoking cessation at discharge? Not applicable  Was education for smoking cessation added to the discharge instructions? Not applicable    Alcohol/Substance Abuse Discharge Plan:   Does the patient have a history of substance/alcohol abuse and requires a referral for treatment? No  Patient referred to the following for substance/alcohol abuse treatment with an appointment? Not applicable  Patient was offered medication to assist with alcohol cessation at discharge? Not applicable  Was education for substance/alcohol abuse added to discharge instructions? Not applicable    Patient discharged to Home; discussed with patient/caregiver and provided to the patient/caregiver either in hard copy or electronically.

## 2018-06-21 DIAGNOSIS — R42 DIZZINESS: ICD-10-CM

## 2018-06-21 RX ORDER — FLUDROCORTISONE ACETATE 0.1 MG/1
0.2 TABLET ORAL DAILY
Qty: 180 TAB | Refills: 2 | Status: SHIPPED | OUTPATIENT
Start: 2018-06-21

## 2018-06-21 NOTE — TELEPHONE ENCOUNTER
Requested Prescriptions     Signed Prescriptions Disp Refills    fludrocortisone (FLORINEF) 0.1 mg tablet 180 Tab 2     Sig: Take 2 Tabs by mouth daily. Authorizing Provider: Cristina Gee     Ordering User: Frantz Man     Per verbal order Dr. Mehran Howard.

## 2021-08-24 NOTE — PROGRESS NOTES
Problem: Falls - Risk of   Goals will be met by 02/25/18  Goal: *Absence of Falls  Document Allie Grider Fall Risk and appropriate interventions in the flowsheet. Outcome: Progressing Towards Goal  Fall Risk Interventions:   Patient remains absent of falls. Medication Interventions: Teach patient to arise slowly    Elimination Interventions: Bed/chair exit alarm     Problem: Depressed Mood (Adult/Pediatric)  Goal: *STG: Participates in treatment plan  Outcome: Progressing Towards Goal  Patient participates in treatment plan. Sad affect, speaks slowly. Patient's history discussed, patient advised uses [facility] every 6 mos. Treatment, medications and discharge discussed. Patient advised she has seen a Dr. Marlen Pate once outside of hospital.  Patient is isolative to bed, med and meal compliant.   Goal: *STG: Remains safe in hospital  Outcome: Progressing Towards Goal  Patient remains safe in hospital    [address] [facility]    Date Treatment Plan Initiated: 02/18/18    Treatment Plan Modalities:  Type of Modality Amount  (x minutes) Frequency (x/week) Duration (x days) Name of Responsible Staff   Community & wrap-up meetings to encourage peer interactions 15 7 1 HILARIO Carlton   Group psychotherapy to assist in building coping skills and internal controls 60 7 1 Ahmet Guillen   Therapeutic activity groups to build coping skills 60 7 1 Ahmet Guillen   Psychoeducation in group setting to address:   Medication education   15 [phone] [facility] [address], RN   Coping skills         Relaxation techniques         Symptom management         Discharge planning         Spirituality    60 2 [address] 60 1 1 Volunteer     Recovery/AA/NA   60 3 1 Volunteer   Physician medication management   15 7 1 Dr. Mukesh Jeffries Island Pedicle Flap With Canthal Suspension Text: The defect edges were debeveled with a #15 scalpel blade.  Given the location of the defect, shape of the defect and the proximity to free margins an island pedicle advancement flap was deemed most appropriate.  Using a sterile surgical marker, an appropriate advancement flap was drawn incorporating the defect, outlining the appropriate donor tissue and placing the expected incisions within the relaxed skin tension lines where possible. The area thus outlined was incised deep to adipose tissue with a #15 scalpel blade.  The skin margins were undermined to an appropriate distance in all directions around the primary defect and laterally outward around the island pedicle utilizing iris scissors.  There was minimal undermining beneath the pedicle flap. A suspension suture was placed in the canthal tendon to prevent tension and prevent ectropion.